# Patient Record
Sex: FEMALE | Race: WHITE | Employment: FULL TIME | URBAN - METROPOLITAN AREA
[De-identification: names, ages, dates, MRNs, and addresses within clinical notes are randomized per-mention and may not be internally consistent; named-entity substitution may affect disease eponyms.]

---

## 2019-07-01 ENCOUNTER — APPOINTMENT (OUTPATIENT)
Dept: GENERAL RADIOLOGY | Age: 24
End: 2019-07-01
Attending: NURSE PRACTITIONER
Payer: OTHER GOVERNMENT

## 2019-07-01 ENCOUNTER — HOSPITAL ENCOUNTER (EMERGENCY)
Age: 24
Discharge: HOME | End: 2019-07-01
Attending: EMERGENCY MEDICINE
Payer: OTHER GOVERNMENT

## 2019-07-01 VITALS
BODY MASS INDEX: 23.39 KG/M2 | WEIGHT: 137 LBS | OXYGEN SATURATION: 97 % | SYSTOLIC BLOOD PRESSURE: 115 MMHG | HEART RATE: 79 BPM | DIASTOLIC BLOOD PRESSURE: 69 MMHG | TEMPERATURE: 98.5 F | RESPIRATION RATE: 18 BRPM | HEIGHT: 64 IN

## 2019-07-01 DIAGNOSIS — T07.XXXA ABRASIONS OF MULTIPLE SITES: ICD-10-CM
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
## 2019-07-01 LAB
HCG UR QL: NEGATIVE
HCG UR QL: NEGATIVE

## 2019-07-01 PROCEDURE — 81025 URINE PREGNANCY TEST: CPT

## 2019-07-01 PROCEDURE — 99284 EMERGENCY DEPT VISIT MOD MDM: CPT

## 2019-07-01 PROCEDURE — 73590 X-RAY EXAM OF LOWER LEG: CPT

## 2019-07-01 PROCEDURE — 71046 X-RAY EXAM CHEST 2 VIEWS: CPT

## 2019-07-01 PROCEDURE — 73502 X-RAY EXAM HIP UNI 2-3 VIEWS: CPT

## 2019-07-01 RX ORDER — IBUPROFEN 600 MG/1
600 TABLET ORAL
Qty: 20 TAB | Refills: 0 | Status: SHIPPED | OUTPATIENT
Start: 2019-07-01 | End: 2020-08-14
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
## 2019-07-02 NOTE — DISCHARGE INSTRUCTIONS

## 2019-07-13 ENCOUNTER — HOSPITAL ENCOUNTER (EMERGENCY)
Age: 24
Discharge: HOME | End: 2019-07-13
Attending: EMERGENCY MEDICINE
Payer: OTHER GOVERNMENT

## 2019-07-13 VITALS
DIASTOLIC BLOOD PRESSURE: 70 MMHG | OXYGEN SATURATION: 97 % | HEART RATE: 77 BPM | WEIGHT: 135 LBS | SYSTOLIC BLOOD PRESSURE: 108 MMHG | HEIGHT: 64 IN | TEMPERATURE: 98.1 F | BODY MASS INDEX: 23.05 KG/M2 | RESPIRATION RATE: 18 BRPM

## 2019-07-13 DIAGNOSIS — N93.9 VAGINAL BLEEDING: Primary | ICD-10-CM

## 2019-07-13 LAB
APPEARANCE UR: CLEAR
BACTERIA URNS QL MICRO: NEGATIVE /HPF
BILIRUB UR QL: NEGATIVE
COLOR UR: ABNORMAL
EPITH CASTS URNS QL MICRO: ABNORMAL /LPF
GLUCOSE UR STRIP.AUTO-MCNC: NEGATIVE MG/DL
HCG UR QL: NEGATIVE
HGB UR QL STRIP: ABNORMAL
HYALINE CASTS URNS QL MICRO: ABNORMAL /LPF (ref 0–5)
KETONES UR QL STRIP.AUTO: NEGATIVE MG/DL
LEUKOCYTE ESTERASE UR QL STRIP.AUTO: NEGATIVE
NITRITE UR QL STRIP.AUTO: NEGATIVE
PH UR STRIP: 6 [PH] (ref 5–8)
PROT UR STRIP-MCNC: NEGATIVE MG/DL
RBC #/AREA URNS HPF: ABNORMAL /HPF (ref 0–5)
SP GR UR REFRACTOMETRY: 1.01 (ref 1–1.03)
UROBILINOGEN UR QL STRIP.AUTO: 0.2 EU/DL (ref 0.2–1)
WBC URNS QL MICRO: ABNORMAL /HPF (ref 0–4)

## 2019-07-13 PROCEDURE — 99283 EMERGENCY DEPT VISIT LOW MDM: CPT

## 2019-07-13 PROCEDURE — 81025 URINE PREGNANCY TEST: CPT

## 2019-07-13 PROCEDURE — 81001 URINALYSIS AUTO W/SCOPE: CPT

## 2019-07-13 RX ORDER — IBUPROFEN 600 MG/1
600 TABLET ORAL
Status: DISCONTINUED | OUTPATIENT
Start: 2019-07-13 | End: 2019-07-13

## 2019-07-13 NOTE — ED PROVIDER NOTES
71-year-old female presenting to ER with vaginal bleeding and lower abdominal cramping. Patient is concerned that she may be having a miscarriage. Patient has had no positive home pregnancy test.  Patient reports having history of previous miscarriage with pregnancy test not registering that she was 10 weeks pregnant. Patient reports only mild abdominal cramping with no abdominal pain. No associated symptoms of nausea, vomiting, diarrhea constipation or dysuria. Patient denies passing any tissue only vaginal bleeding with some clots. Patient has no symptoms of feeling fatigue or malaise or lightheaded. [facility] 6/12/19. No positive preg test.   Mild cramping now and filling 1 tampon q 4 hours. No past medical history on file. No past surgical history on file. No family history on file.     Social History     Socioeconomic History    Marital status: SINGLE     Spouse name: Not on file    Number of children: Not on file    Years of education: Not on file    Highest education level: Not on file   Occupational History    Not on file   Social Needs    Financial resource strain: Not on file    Food insecurity:     Worry: Not on file     Inability: Not on file    Transportation needs:     Medical: Not on file     Non-medical: Not on file   Tobacco Use    Smoking status: Never Smoker    Smokeless tobacco: Never Used   Substance and Sexual Activity    Alcohol use: Not Currently    Drug use: Not on file    Sexual activity: Not on file   Lifestyle    Physical activity:     Days per week: Not on file     Minutes per session: Not on file    Stress: Not on file   Relationships    Social connections:     Talks on phone: Not on file     Gets together: Not on file     Attends Adventism service: Not on file     Active member of club or organization: Not on file     Attends meetings of clubs or organizations: Not on file     Relationship status: Not on file    Intimate partner violence: Fear of current or ex partner: Not on file     Emotionally abused: Not on file     Physically abused: Not on file     Forced sexual activity: Not on file   Other Topics Concern    Not on file   Social History Narrative    Not on file         ALLERGIES: Patient has no known allergies. Review of Systems   Constitutional: Negative for chills and fever. HENT: Negative for congestion and sore throat. Eyes: Negative for pain. Respiratory: Negative for shortness of breath. Cardiovascular: Negative for chest pain. Gastrointestinal: Negative for abdominal pain, diarrhea, nausea and vomiting. Genitourinary: Positive for vaginal bleeding. Negative for dysuria, flank pain, pelvic pain, urgency, vaginal discharge and vaginal pain. Musculoskeletal: Negative for back pain and neck pain. Skin: Negative for rash. Neurological: Negative for dizziness and headaches. Vitals:    07/13/19 0530   BP: 108/70   Pulse: 77   Resp: 18   Temp: 98.1 °F (36.7 °C)   SpO2: 97%   Weight: 61.2 kg (135 lb)   Height: 5' 4\" (1.626 m)            Physical Exam   Constitutional: She is oriented to person, place, and time. She appears well-developed and well-nourished. HENT:   Head: Normocephalic. Mouth/Throat: Oropharynx is clear and moist.   Eyes: Conjunctivae are normal.   Neck: Normal range of motion. Neck supple. Cardiovascular: Normal rate and regular rhythm. Pulmonary/Chest: Effort normal and breath sounds normal. No respiratory distress. Abdominal: Soft. Bowel sounds are normal. There is no tenderness. Genitourinary: Uterus normal. Rectal exam shows guaiac negative stool. Pelvic exam was performed with patient prone. There is no rash, tenderness, lesion or injury on the right labia. There is no rash, tenderness, lesion or injury on the left labia. Cervix exhibits no motion tenderness and no friability. Right adnexum displays no mass, no tenderness and no fullness.  Left adnexum displays no mass, no tenderness and no fullness. There is bleeding in the vagina. Genitourinary Comments: Os closed   Musculoskeletal: Normal range of motion. Neurological: She is alert and oriented to person, place, and time. No gross motor or sensory deficits   Skin: Skin is warm. Capillary refill takes less than 2 seconds. No rash noted. MDM  Number of Diagnoses or Management Options  Vaginal bleeding:   Diagnosis management comments: Patient presented with vaginal bleeding passing clots. Pregnancy test negative. Abdominal exam unremarkable. Pelvic exam only mild vaginal bleeding but no tenderness cervical Os closed. Patient is last mental cycle 1 month ago. Explained the patient that the negative pregnancy test is unlikely miscarriage and most likely her menstrual cycle or dysfunctional uterine bleeding. Advised patient to follow-up with her OB/GYN.          Procedures

## 2019-07-13 NOTE — ED TRIAGE NOTES
Pt states cramping for 2 days. Today states heavy bleeding, thinks possible miscarriage. [facility] 6/12/19. No positive preg test.   Mild cramping now and filling 1 tampon q 4 hours.

## 2019-07-13 NOTE — DISCHARGE INSTRUCTIONS
Patient Education        Abnormal Uterine Bleeding: Care Instructions  Your Care Instructions    Abnormal uterine bleeding (AUB) is irregular bleeding from the uterus that is longer or heavier than usual or does not occur at your regular time. Sometimes it is caused by changes in hormone levels. It can also be caused by growths in the uterus, such as fibroids or polyps. Sometimes a cause cannot be found. You may have heavy bleeding when you are not expecting your period. Your doctor may suggest a pregnancy test, if you think you are pregnant. Follow-up care is a key part of your treatment and safety. Be sure to make and go to all appointments, and call your doctor if you are having problems. It's also a good idea to know your test results and keep a list of the medicines you take. How can you care for yourself at home? · Be safe with medicines. Take pain medicines exactly as directed. ? If the doctor gave you a prescription medicine for pain, take it as prescribed. ? If you are not taking a prescription pain medicine, ask your doctor if you can take an over-the-counter medicine. · You may be low in iron because of blood loss. Eat a balanced diet that is high in iron and vitamin C. Foods rich in iron include red meat, shellfish, eggs, beans, and leafy green vegetables. Talk to your doctor about whether you need to take iron pills or a multivitamin. When should you call for help? Call 911 anytime you think you may need emergency care. For example, call if:    · You passed out (lost consciousness).    Call your doctor now or seek immediate medical care if:    · You have new or worse belly or pelvic pain.     · You have severe vaginal bleeding.     · You feel dizzy or lightheaded, or you feel like you may faint.    Watch closely for changes in your health, and be sure to contact your doctor if:    · You think you may be pregnant.     · Your bleeding gets worse.     · You do not get better as expected.    Where can you learn more? Go to http://dunia-brandy.info/. Enter D206 in the search box to learn more about \"Abnormal Uterine Bleeding: Care Instructions. \"  Current as of: May 14, 2018  Content Version: 11.9  © 9247-1732 [employer], [employer] [place]. Care instructions adapted under license by [employer] (which disclaims liability or warranty for this information). If you have questions about a medical condition or this instruction, always ask your healthcare professional. [place] [address] any warranty or liability for your use of this information.

## 2019-07-17 NOTE — ED PROVIDER NOTES
75-year-old female presenting to ER with complaint of vaginal bleeding. Patient reports she is concerned she is possibly having a miscarriage however has had no positive pregnancy test at home. Patient reports having lower abdominal cramping for 2 days and heavy bleeding with clots. Patient reports last menstrual cycle was 6/12/2019  Patient denies severe abdominal pain. No discharge. No nausea vomiting diarrhea. No fever or chills. Patient reports that she has history of a miscarriage previously and they did not detect pregnancy until she was 10 weeks along. Patient has no other complaints           No past medical history on file. No past surgical history on file. No family history on file.     Social History     Socioeconomic History    Marital status: SINGLE     Spouse name: Not on file    Number of children: Not on file    Years of education: Not on file    Highest education level: Not on file   Occupational History    Not on file   Social Needs    Financial resource strain: Not on file    Food insecurity:     Worry: Not on file     Inability: Not on file    Transportation needs:     Medical: Not on file     Non-medical: Not on file   Tobacco Use    Smoking status: Never Smoker    Smokeless tobacco: Never Used   Substance and Sexual Activity    Alcohol use: Not Currently    Drug use: Not on file    Sexual activity: Not on file   Lifestyle    Physical activity:     Days per week: Not on file     Minutes per session: Not on file    Stress: Not on file   Relationships    Social connections:     Talks on phone: Not on file     Gets together: Not on file     Attends Sabianism service: Not on file     Active member of club or organization: Not on file     Attends meetings of clubs or organizations: Not on file     Relationship status: Not on file    Intimate partner violence:     Fear of current or ex partner: Not on file     Emotionally abused: Not on file     Physically abused: Not on file     Forced sexual activity: Not on file   Other Topics Concern    Not on file   Social History Narrative    Not on file         ALLERGIES: Patient has no known allergies. Review of Systems   Constitutional: Negative for chills and fever. HENT: Negative for congestion and sore throat. Eyes: Negative for pain. Respiratory: Negative for shortness of breath. Cardiovascular: Negative for chest pain. Gastrointestinal: Negative for abdominal distention, abdominal pain, constipation, diarrhea, nausea, rectal pain and vomiting. Genitourinary: Positive for vaginal bleeding. Negative for dysuria, flank pain, pelvic pain, vaginal discharge and vaginal pain. Musculoskeletal: Negative for back pain and neck pain. Skin: Negative for rash. Neurological: Negative for dizziness and headaches. Vitals:    07/13/19 0530   BP: 108/70   Pulse: 77   Resp: 18   Temp: 98.1 °F (36.7 °C)   SpO2: 97%   Weight: 61.2 kg (135 lb)   Height: 5' 4\" (1.626 m)            Physical Exam   Constitutional: She is oriented to person, place, and time. She appears well-developed and well-nourished. HENT:   Head: Normocephalic. Mouth/Throat: Oropharynx is clear and moist.   Eyes: Conjunctivae are normal.   Neck: Normal range of motion. Neck supple. Cardiovascular: Normal rate and regular rhythm. Pulmonary/Chest: Effort normal and breath sounds normal. No respiratory distress. Abdominal: Soft. Bowel sounds are normal. There is no tenderness. Genitourinary: Uterus normal. Cervix exhibits no motion tenderness, no discharge and no friability. Right adnexum displays no mass, no tenderness and no fullness. Left adnexum displays no mass, no tenderness and no fullness. There is bleeding in the vagina. Genitourinary Comments: Os closed   Musculoskeletal: Normal range of motion. Neurological: She is alert and oriented to person, place, and time. No gross motor or sensory deficits   Skin: Skin is warm.  Capillary refill takes less than 2 seconds. No rash noted.         MDM  Number of Diagnoses or Management Options  Vaginal bleeding:          Procedures

## 2020-05-14 NOTE — PROGRESS NOTES
Annual exam ages 21-44  (new patient)    Maddie Washington is a ,  25 y[facility] Patient's last menstrual period was 2020., who presents for her annual checkup. Since her last annual GYN exam about 2017, she has had the following changes in her health history: D&C 2018 at 10 weeks pregnant. No complications. Concerned with not being able to get pregnant again. Would like UPT today. Not actively trying to get pregnant, but not trying to prevent it. Has been with current partner for a little over a year (different partner from prev pregnancy). He has 2 children, 2 and 3yo. ADDITIONAL CONCERNS:  She is having significant concerns with a possible UTI. Burning when urinating. Would like a urine culture collected today. Says partner recently got tested for STD's and were all negative. Pt interested in full STD testing today. Would like blood work including HSV. With regard to the Gardasil vaccine, she has not received it yet. Menstrual status:    Her periods are moderate in flow. She is using three to five pads or tampons per day, usually regular and last 26-30 days. Last cycle was 30 days, but is usually about 26-28. She denies dysmenorrhea. She denies premenstrual symptoms. Contraception:    The current method of family planning is none. Sexual history:     She  reports being sexually active and has had partner(s) who are Male. She reports using the following method of birth control/protection: None. .        Pap and Mammogram History:    Her most recent Pap smear was normal, per pt report, obtained 2017 at another office. She does not have a history of abnormal paps. The patient has never had a mammogram.    Breast Cancer History/Substance Abuse:    History reviewed. No pertinent past medical history.   Past Surgical History:   Procedure Laterality Date    HX DILATION AND CURETTAGE  2018    10 wk miscarriage     OB History    Para Term  AB Living   1       1     SAB TAB Ectopic Molar Multiple Live Births   1                # Outcome Date GA Lbr [place]/2nd Weight Sex Delivery Anes PTL Lv   1 SAB 04/2018               Current Outpatient Medications   Medication Sig Dispense Refill    PNV No12-Iron-FA-DSS-OM-3 29 mg iron-1 mg -50 mg CPKD Take  by mouth.  ibuprofen (MOTRIN) 600 mg tablet Take 1 Tab by mouth every six (6) hours as needed for Pain. 20 Tab 0     Allergies: Patient has no known allergies.    Social History     Socioeconomic History    Marital status:      Spouse name: Not on file    Number of children: Not on file    Years of education: Not on file    Highest education level: Not on file   Occupational History    Not on file   Social Needs    Financial resource strain: Not on file    Food insecurity     Worry: Not on file     Inability: Not on file    Transportation needs     Medical: Not on file     Non-medical: Not on file   Tobacco Use    Smoking status: Never Smoker    Smokeless tobacco: Never Used   Substance and Sexual Activity    Alcohol use: Not Currently    Drug use: Never    Sexual activity: Yes     Partners: Male     Birth control/protection: None     Comment: twice per week (weekends)   Lifestyle    Physical activity     Days per week: Not on file     Minutes per session: Not on file    Stress: Not on file   Relationships    Social connections     Talks on phone: Not on file     Gets together: Not on file     Attends Muslim service: Not on file     Active member of club or organization: Not on file     Attends meetings of clubs or organizations: Not on file     Relationship status: Not on file    Intimate partner violence     Fear of current or ex partner: Not on file     Emotionally abused: Not on file     Physically abused: Not on file     Forced sexual activity: Not on file   Other Topics Concern    Not on file   Social History Narrative    Not on file     Tobacco History:  reports that she has never smoked. She has never used smokeless tobacco.  Alcohol Abuse:  reports previous alcohol use. Drug Abuse:  reports no history of drug use. There is no problem list on file for this patient. History reviewed. No pertinent family history.     Review of Systems - History obtained from the patient  Constitutional: negative for weight loss, fever, night sweats  HEENT: negative for hearing loss, earache, congestion, snoring, sorethroat  CV: negative for chest pain, palpitations, edema  Resp: negative for cough, shortness of breath, wheezing  GI: negative for change in bowel habits, abdominal pain, black or bloody stools  : negative for frequency, hematuria, vaginal discharge  MSK: negative for back pain, joint pain, muscle pain  Breast: negative for breast lumps, nipple discharge, galactorrhea  Skin :negative for itching, rash, hives  Neuro: negative for dizziness, headache, confusion, weakness  Psych: negative for anxiety, depression, change in mood  Heme/lymph: negative for bleeding, bruising, pallor    Physical Exam    Visit Vitals  /71 (BP 1 Location: Left arm, BP Patient Position: Sitting)   Ht 5' 4\" (1.626 m)   Wt 151 lb (68.5 kg)   LMP 04/19/2020   BMI 25.92 kg/m²       Constitutional  · Appearance: well-nourished, well developed, alert, in no acute distress    HENT  · Head and Face: appears normal    Neck  · Inspection/Palpation: normal appearance, no masses or tenderness  · Lymph Nodes: no lymphadenopathy present  · Thyroid: gland size normal, nontender, no nodules or masses present on palpation    Chest  · Respiratory Effort: breathing unlabored  · Auscultation: normal breath sounds    Cardiovascular  · Heart:  · Auscultation: regular rate and rhythm without murmur    Breasts  · Inspection of Breasts: breasts symmetrical, no skin changes, no discharge present, nipple appearance normal, no skin retraction present  · Palpation of Breasts and Axillae: no masses present on palpation, mild bilateral breast tenderness (due for period in 2d)  · Axillary Lymph Nodes: no lymphadenopathy present    Gastrointestinal  · Abdominal Examination: abdomen non-tender to palpation, normal bowel sounds, no masses present  · Liver and spleen: no hepatomegaly present, spleen not palpable  · Hernias: no hernias identified    Genitourinary  · External Genitalia: normal appearance for age, no discharge present, no tenderness present, no inflammatory lesions present, no masses present, no atrophy present  · Vagina: normal vaginal vault without central or paravaginal defects, no discharge present, no inflammatory lesions present, no masses present  · Bladder: non-tender to palpation  · Urethra: appears normal  · Cervix: normal   · Uterus: normal size, shape and consistency  · Adnexa: no adnexal tenderness present, no adnexal masses present  · Perineum: perineum within normal limits, no evidence of trauma, no rashes or skin lesions present  · Anus: anus within normal limits, no hemorrhoids present  · Inguinal Lymph Nodes: no lymphadenopathy present    Skin  · General Inspection: no rash, no lesions identified    Neurologic/Psychiatric  · Mental Status:  · Orientation: grossly oriented to person, place and time  · Mood and Affect: mood normal, affect appropriate      Results for orders placed or performed in visit on 05/15/20   AMB POC URINE PREGNANCY TEST, VISUAL COLOR COMPARISON   Result Value Ref Range    VALID INTERNAL CONTROL POC Yes     HCG urine, Ql. (POC) Negative Negative         Assessment & Plan:  · Routine gynecologic examination. Pap done. · NuSwab screening <26yo. · Dysuria -> UCx  · Vulvovaginal burning -> add BV.yeast to N[facility]wab Plus  · Req STD screen -> [facility]. Will draw HIV, T.pallidum, HBsAg, HepC, HSV. Handout given.   · UPT at pt request -> neg  · Counseled re: diet, exercise, healthy lifestyle  · Return for yearly wellness visits  · Gardisil counseling provided  · Discussed fertility w/u not done unless actively trying to get pregnant. Insurance coverage variable, advised to check if she wants to pursue. Briefly discussed OPK, SA.  · Patient verbalized understanding      Orders Placed This Encounter    CULTURE, URINE    HEP B SURFACE AG    HCV AB W/REFLEX VERIFICATION    T PALLIDUM SCREEN W/REFLEX    HIV SCREEN, 4TH GEN. W/REFLEX CONFIRM    HSV 1 AND 2-SPEC AB, IGG W/RFX TO SUPPL HSV-2 TESTING    NUSWAB VAGINITIS PLUS ([facility])    AMB POC URINE PREGNANCY TEST, VISUAL COLOR COMPARISON    PAP, IG, RFX HPV ASCUS ([id number])     Order Specific Question:   Pap Source? Answer:   Cervical and Endocervical     Order Specific Question:   Total Hysterectomy? Answer:   No     Order Specific Question:   Supracervical Hysterectomy? Answer:   No     Order Specific Question:   Post Menopausal?     Answer:   No     Order Specific Question:   Hormone Therapy? Answer:   No     Order Specific Question:   IUD? Answer:   No     Order Specific Question:   Abnormal Bleeding? Answer:   No     Order Specific Question:   Pregnant     Answer:   No     Order Specific Question:   Post Partum? Answer:    No

## 2020-05-15 ENCOUNTER — OFFICE VISIT (OUTPATIENT)
Dept: OBGYN CLINIC | Age: 25
End: 2020-05-15

## 2020-05-15 VITALS
BODY MASS INDEX: 25.78 KG/M2 | SYSTOLIC BLOOD PRESSURE: 111 MMHG | WEIGHT: 151 LBS | DIASTOLIC BLOOD PRESSURE: 71 MMHG | HEIGHT: 64 IN

## 2020-05-15 DIAGNOSIS — Z12.4 SCREENING FOR CERVICAL CANCER: ICD-10-CM

## 2020-05-15 DIAGNOSIS — Z32.02 PREGNANCY TEST NEGATIVE: ICD-10-CM

## 2020-05-15 DIAGNOSIS — R30.0 BURNING WITH URINATION: ICD-10-CM

## 2020-05-15 DIAGNOSIS — Z11.3 SCREENING FOR STD (SEXUALLY TRANSMITTED DISEASE): ICD-10-CM

## 2020-05-15 DIAGNOSIS — N94.9 VAGINAL BURNING: ICD-10-CM

## 2020-05-15 DIAGNOSIS — Z01.419 ENCOUNTER FOR GYNECOLOGICAL EXAMINATION: Primary | ICD-10-CM

## 2020-05-15 LAB
HCG URINE, QL. (POC): NEGATIVE
VALID INTERNAL CONTROL?: YES

## 2020-05-19 LAB
BACTERIA UR CULT: NO GROWTH
COMMENT, 144067: NORMAL
CYTOLOGIST CVX/VAG CYTO: NORMAL
CYTOLOGY CVX/VAG DOC CYTO: NORMAL
CYTOLOGY CVX/VAG DOC THIN PREP: NORMAL
DX ICD CODE: NORMAL
HBV SURFACE AG SERPL QL IA: NEGATIVE
HCV AB S/CO SERPL IA: <0.1 S/CO RATIO (ref 0–0.9)
HIV 1+2 AB+HIV1 P24 AG SERPL QL IA: NON REACTIVE
HSV1 IGG SER IA-ACNC: 7.72 INDEX (ref 0–0.9)
HSV2 IGG SER IA-ACNC: <0.91 INDEX (ref 0–0.9)
LABCORP, 190119: NORMAL
Lab: NORMAL
OTHER STN SPEC: NORMAL
STAT OF ADQ CVX/VAG CYTO-IMP: NORMAL
TREPONEMA PALLIDUM IGG+IGM AB [PRESENCE] IN SERUM OR PLASMA BY IMMUNOASSAY: NON REACTIVE

## 2020-05-20 ENCOUNTER — TELEPHONE (OUTPATIENT)
Dept: OBGYN CLINIC | Age: 25
End: 2020-05-20

## 2020-05-20 NOTE — TELEPHONE ENCOUNTER
----- Message from Bakari Kaur MD sent at 5/19/2020  4:59 PM EDT -----  Pap smear is normal.  Urine culture is negative for infection. Vaginal swab is pending. Blood work is positive for Herpes Type 1 (HSV I). This is most often an oral infection (cold sores), but can also be a genital infection. No way to know based on blood work. Other blood work is normal/negative (HIV, syphilis, Hepatitis B and C, Herpes Type 2). Please notify pt.

## 2020-05-21 NOTE — TELEPHONE ENCOUNTER
Call received at 605am    25year old patient last seen in the office on 5/15/2020. Patient calling back regarding her lab work. Patient advised regarding MD reviewed lab work and verbalized understanding. Result Notes for PAP, IG, RFX HPV ASCUS ([id number])     Notes recorded by Wilner Henderson MD on 5/19/2020 at 4:59 PM EDT  Pap smear is normal.  Urine culture is negative for infection. Vaginal swab is pending. Blood work is positive for Herpes Type 1 (HSV I).  This is most often an oral infection (cold sores), but can also be a genital infection.  No way to know based on blood work. Other blood work is normal/negative (HIV, syphilis, Hepatitis B and C, Herpes Type 2). Please notify pt.

## 2020-05-30 LAB
A VAGINAE DNA VAG QL NAA+PROBE: NORMAL SCORE
BVAB2 DNA VAG QL NAA+PROBE: NORMAL SCORE
C ALBICANS DNA VAG QL NAA+PROBE: NEGATIVE
C GLABRATA DNA VAG QL NAA+PROBE: NEGATIVE
C TRACH DNA VAG QL NAA+PROBE: NEGATIVE
MEGA1 DNA VAG QL NAA+PROBE: NORMAL SCORE
N GONORRHOEA DNA VAG QL NAA+PROBE: NEGATIVE
T VAGINALIS DNA VAG QL NAA+PROBE: NEGATIVE

## 2020-06-22 ENCOUNTER — NURSE TRIAGE (OUTPATIENT)
Dept: OTHER | Facility: CLINIC | Age: 25
End: 2020-06-22

## 2020-06-22 NOTE — TELEPHONE ENCOUNTER
Pt report cough, HA, congestions, that started this weekend. Pt report she works on [employer]. Pt was tested at Urgent Care for the coronavirus and awaiting results. Pt denies CP, SOB, and fever at this time. Pt in agreement with care advice and disposition. Pt given [employer] phone number. Reason for Disposition   [1] Symptoms of COVID-19 (e.g., cough, fever, SOB, or others) AND [2] within 14 days of EXPOSURE (close contact) with diagnosed or suspected COVID-19 patient   COVID-19, questions about    Answer Assessment - Initial Assessment Questions  1. CLOSE CONTACT: \"Who is the person with the confirmed or suspected COVID-19 infection that you were exposed to?\"      Patient     2. PLACE of CONTACT: \"Where were you when you were exposed to COVID-19? \" (e.g., home, school, medical waiting room; which city?)      Pt works on [place] unit    3. TYPE of CONTACT: \"How much contact was there? \" (e.g., sitting next to, live in same house, work in same office, same building)      12 hrs     4. DURATION of CONTACT: \"How long were you in contact with the COVID-19 patient? \" (e.g., a few seconds, passed by person, a few minutes, live with the patient)      Work on the floor     5. DATE of CONTACT: \"When did you have contact with a COVID-19 patient? \" (e.g., how many days ago)      06/19/2020    6. TRAVEL: \"Have you traveled out of the country recently? \" If so, \"When and where? \"      * Also ask about out-of-state travel, since the CDC has identified some high-risk cities for community spread in the [address]. * Note: Travel becomes less relevant if there is widespread community transmission where the patient lives. No     7. COMMUNITY SPREAD: \"Are there lots of cases of COVID-19 (community spread) where you live? \" (See public health department website, if unsure)        Unsure     8. SYMPTOMS: \"Do you have any symptoms? \" (e.g., fever, cough, breathing difficulty)      Cough, HA, congestion, and sore throat. 9.  PREGNANCY OR POSTPARTUM: \"Is there any chance you are pregnant? \" \"When was your last menstrual period? \" \"Did you deliver in the last 2 weeks? \"        10. HIGH RISK: \"Do you have any heart or lung problems? Do you have a weak immune system? \" (e.g., CHF, COPD, asthma, HIV positive, chemotherapy, renal failure, diabetes mellitus, sickle cell anemia)        No    Protocols used: CORONAVIRUS (COVID-19) EXPOSURE-ADULT-[place], CORONAVIRUS (COVID-19) DIAGNOSED OR SUSPECTED-ADULT-[place]

## 2020-07-24 ENCOUNTER — NURSE TRIAGE (OUTPATIENT)
Dept: OTHER | Facility: CLINIC | Age: 25
End: 2020-07-24

## 2020-07-24 ENCOUNTER — TELEPHONE (OUTPATIENT)
Dept: OBGYN CLINIC | Age: 25
End: 2020-07-24

## 2020-07-24 NOTE — TELEPHONE ENCOUNTER
Reason for Disposition   MILD constant abdominal pain (e.g., doesn't interfere with normal activities) and present > 2 hours    Answer Assessment - Initial Assessment Questions  1. LOCATION: \"Where does it hurt? \"       Upper right quadrant  2. RADIATION: \"Does the pain shoot anywhere else? \" (e.g., chest, back, shoulder)      Down to right side and lower back  3. ONSET: \"When did the pain begin? \" (e.g., minutes, hours or days ago)       Last night  4. ONSET: \"Gradual or sudden onset? \"      Sudden onset  5. PATTERN \"Does the pain come and go, or has it been constant since it started? \"       constant  6. SEVERITY: \"How bad is the pain? \" \"What does it keep you from doing? \"  (e.g., Scale 1-10; mild, moderate, or severe)    - MILD (1-3): doesn't interfere with normal activities, abdomen soft and not tender to touch     - MODERATE (4-7): interferes with normal activities or awakens from sleep, tender to touch     - SEVERE (8-10): excruciating pain, doubled over, unable to do any normal activities     4/10  7. RECURRENT SYMPTOM: \"Have you ever had this type of abdominal pain before? \" If so, ask: \"When was the last time? \" and \"What happened that time? \"       no  8. CAUSE: \"What do you think is causing the abdominal pain? \"      Not sure  9. RELIEVING/AGGRAVATING FACTORS: \"What makes it better or worse? \" (e.g., antacids, bowel movement, movement)      Coughing makes it worse  10. OTHER SYMPTOMS: \"Has there been any vaginal bleeding, fever, vomiting, diarrhea, or urine problems? \"        No other   11. ESTELLA: \"What date are you expecting to deliver? \"        03/2021    Protocols used: PREGNANCY - ABDOMINAL PAIN LESS THAN 20 WEEKS EGA-ADULT-[place]    First appointment is scheduled for August 14, 2020. Caller triaged, care advice provided, caller verbalized understanding. Please do not reply to the triage nurse through this encounter. Any subsequent communication should be directly with the patient.

## 2020-07-24 NOTE — TELEPHONE ENCOUNTER
Patient calling this afternoon. Last seen 05/15/2020. States she about 5 and a half weeks pregnant and she is having bad upper quadrant pain in abdomin, states it radiates to lower hip and back. States she's concerned due to a previous miscarriage 2 years ago. Denies bleeding but has minor cramps. Please advise       Transferred to [facility]'s nurse.

## 2020-08-13 NOTE — PROGRESS NOTES
Current pregnancy history:    Christopher Camp is a 25 y.o. female who presents for the evaluation of pregnancy. Patient's last menstrual period was 06/15/2020. LMP history:  The date of her LMP is certain. Her menstrual cycles are regular and occur approximately every 28 days and range from 3 to 5 days. The last menses did last the usual number of days. A urine pregnancy test was positive. She was not on the pill at conception. Based on her LMP her EGA is 8 weeks and 4 days giving an [address] of 3/22/21. Ultrasound data:  She had an ultrasound done by the ultrasound tech today which revealed a viable sinha pregnancy with a gestational age of 11 weeks and 4 days giving an [address] of 3/22/21. Ultrasound details:    TA ULTRASOUND PERFORMED. A SINGLE VIABLE 8W4D IUP IS SEEN WITH NORMAL CARDIAC RHYTHM. GESTATIONAL AGE BASED ON TODAYS US.  A NORMAL APPEARING YOLK [address] IS SEEN. RIGHT & LEFT OVARIES APPEAR WITHIN NORMAL LIMITS. NO FREE FLUID SEEN IN THE CDS. Pregnancy symptoms:    Since her LMP she has experienced  urinary frequency, breast tenderness, fatigue and nausea. Has not been taking anything for the nausea. She has not been vomiting over the last few weeks. Associated signs and symptoms which she denies: dysuria, discharge, vaginal bleeding. She states she has not gained/lost weight (review of CC shows wt down 1# from AE in May)    Relevant past pregnancy history:  She has the following pregnancy history:   D&C for SAB 2018, 10w by dates, 7w by US      Relevant past medical history:(relevant to this pregnancy): noncontributory. 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 Positive for nothing. Exposure history: There is/are no indoor cat/s in the home. The patient was instructed to not change the cat litter.    She admits close contact with children on a regular basis. She has had chicken pox or the vaccine in the past.   Patient denies issues with domestic violence. Genetic Screening/Teratology Counseling: (Includes patient, baby's father, or anyone in either family with:)  3.  Patient's age >/= 28 at [facility]?-- no  .   2. Thalassemia ([facility], [place], [address], or  background): MCV<80?--no.     3.  Neural tube defect (meningomyelocele, spina bifida, anencephaly)?--no.   4.  Congenital heart defect?--no.  5.  Down syndrome?--no.   6.  Black-Sachs (Islam, [place])?--no.   7.  Canavan's Disease?--no.   8.  Familial Dysautonomia?--no.   9.  Sickle cell disease or trait ()? --no   The patient has not been tested for sickle trait  10. Hemophilia or other blood disorders?--no. 11.  Muscular dystrophy?--no. 12.  Cystic fibrosis?--no. 13.  Sumter's Chorea?--no. 14.  Mental retardation/autism (if yes was person tested for Fragile X)?--no. 15.  Other inherited genetic or chromosomal disorder?--no. 12.  Maternal metabolic disorder (DM, PKU, etc)?--no. 17.  Patient or FOB with a child with a birth defect not listed above?--no.  17a. Patient or FOB with a birth defect themselves?--no. 18.  Recurrent pregnancy loss, or stillbirth?--no. 19.  Any medications since LMP other than prenatal vitamins (include vitamins,  supplements, OTC meds, drugs, alcohol)?--no. 20.  Any other genetic/environmental exposure to discuss?--no. Infection History:  1. Lives with someone with TB or TB exposed?--no.   2.  Patient or partner has history of genital herpes?--no.  3.  Rash or viral illness since LMP?--no.    4.  History of STD (GC, CT, HPV, syphilis, HIV)? --no   5. Other: OTHER? No past medical history on file.   Past Surgical History:   Procedure Laterality Date    HX DILATION AND CURETTAGE  04/2018    10 wk miscarriage     Social History     Occupational History    Not on file   Tobacco Use    Smoking status: Never Smoker    Smokeless tobacco: Never Used   Substance and Sexual Activity    Alcohol use: Not Currently    Drug use: Never    Sexual activity: Yes     Partners: Male     Birth control/protection: None     Comment: twice per week (weekends)     No family history on file. No Known Allergies  Prior to Admission medications    Medication Sig Start Date End Date Taking? Authorizing Provider   doxylamine-pyridoxine, vit B6, (Diclegis) 10-10 mg TbEC DR tablet 2 tabs at bedtime, then 1 tab in AM if needed, then 1 tab in afternoon if needed. Max 4 tabs/d 8/14/20  Yes Chuy Shaw MD   promethazine (PHENERGAN) 25 mg tablet Take 1 Tab by mouth every six (6) hours as needed for Nausea. 8/14/20  Yes Chuy Shaw MD   ondansetron (Zofran ODT) 4 mg disintegrating tablet Take 1 Tab by mouth every eight (8) hours as needed for Nausea. 8/14/20  Yes Chuy Shaw MD   PNV No12-Iron-FA-DSS-OM-3 29 mg iron-1 mg -50 mg CPKD Take  by mouth.    Yes Other, MD [place]        Review of Systems: History obtained from the patient  Constitutional: negative for weight loss, fever, night sweats  HEENT: negative for hearing loss, earache, congestion, snoring, sorethroat  CV: negative for chest pain, palpitations, edema  Resp: negative for cough, shortness of breath, wheezing  Breast: negative for breast lumps, nipple discharge, galactorrhea  GI: negative for change in bowel habits, abdominal pain, black or bloody stools  : negative for frequency, dysuria, hematuria, vaginal discharge  MSK: negative for back pain, joint pain, muscle pain  Skin: negative for itching, rash, hives  Neuro: negative for dizziness, headache, confusion, weakness  Psych: negative for anxiety, depression, change in mood  Heme/lymph: negative for bleeding, bruising, pallor    Objective:  Visit Vitals  /72 (BP 1 Location: Left arm, BP Patient Position: Sitting)   Ht 5' 4\" (1.626 m)   Wt 150 lb (68 kg)   LMP 06/15/2020   BMI 25.75 kg/m² Physical Exam:   PHYSICAL EXAMINATION    Constitutional  · Appearance: well-nourished, well developed, alert, in no acute distress    HENT  · Head  · Face: appears normal  · Eyes: appear normal  · Ears: normal  · Mouth: normal  · Lips: no lesions    Neck  · Inspection/Palpation: normal appearance, no masses or tenderness  · Lymph Nodes: no lymphadenopathy present  · Thyroid: gland size normal, nontender, no nodules or masses present on palpation    Chest  · Respiratory Effort: breathing unlabored  · Auscultation: normal breath sounds    Cardiovascular  · Heart:  · Auscultation: regular rate and rhythm without murmur    Breasts  · Inspection of Breasts: breasts symmetrical, no skin changes, no discharge present, nipple appearance normal, no skin retraction present  · Palpation of Breasts and Axillae: no masses present on palpation, no breast tenderness  · Axillary Lymph Nodes: no lymphadenopathy present    Gastrointestinal  · Abdominal Examination: abdomen non-tender to palpation, normal bowel sounds, no masses present  · Liver and spleen: no hepatomegaly present, spleen not palpable  · Hernias: no hernias identified    Genitourinary  · External Genitalia: normal appearance for age, no discharge present, no tenderness present, no inflammatory lesions present, no masses present, no atrophy present  · Vagina: normal vaginal vault without central or paravaginal defects, no discharge present, no inflammatory lesions present, no masses present  · Bladder: non-tender to palpation  · Urethra: appears normal  · Cervix: normal   · Uterus: enlarged ~8w, normal shape, soft  · Adnexa: no adnexal tenderness present, no adnexal masses present  · Perineum: perineum within normal limits, no evidence of trauma, no rashes or skin lesions present  · Anus: anus within normal limits, no hemorrhoids present  · Inguinal Lymph Nodes: no lymphadenopathy present    Skin  · General Inspection: no rash, no lesions identified    Neurologic/Psychiatric  · Mental Status:  · Orientation: grossly oriented to person, place and time  · Mood and Affect: mood normal, affect appropriate    Assessment:   Intrauterine pregnancy:  - U=D. LMP for datin/15/20 -> GUS=3/22/21  - plan panorama and horizon @ 12w visit  - sign up for classes  - nausea. Rec OTC B6, Unisom. eRX Diclegis, phenergan, zofran        Plan:     · Offered CF testing, CVS, Nuchal Translucency, MSAFP, amnio, and discussed NIPT  · Course of pregnancy discussed including visit schedule, routine U/S, glucola testing, etc.  · Avoid alcoholic beverages and illicit/recreational drugs use  · Take prenatal vitamins or folic acid daily. · Hospital and practice style discussed with coverage system. · Discussed nutrition, toxoplasmosis precautions, sexual activity, exercise, need for influenza vaccine, environmental and work hazards, travel advice, screen for domestic violence, need for seat belts. · Discussed seafood, unpasteurized dairy products, deli meat, artificial sweeteners, and caffeine. · Information on prenatal classes/breastfeeding given. · Patient encouraged not to smoke. · Discussed current prescription drug use. Given medication list.  · Discussed the use of over the counter medications and chemicals. · Pt understands risk of hemorrhage during pregnancy and post delivery and would accept blood products if necessary in life-threatening emergencies      Handouts given to pt. Orders Placed This Encounter    CULTURE, URINE    CT/NG/T.VAGINALIS AMPLIFICATION     Order Specific Question:   Specimen source     Answer:   Vaginal [776]    HEP B SURFACE AG    HIV SCREEN, [facility]. W/REFLEX CONFIRM    CBC W/O DIFF    RUBELLA AB, IGG    T PALLIDUM SCREEN W/REFLEX    TYPE & SCREEN     ENTER SURGERY DATE IF FOR PRE-OP TESTING. Order Specific Question:   Has patient been transfused or pregnant in the last 3 mos. ?      Answer:   Unknown    doxylamine-pyridoxine, vit B6, (Diclegis) 10-10 mg TbEC DR tablet     Si tabs at bedtime, then 1 tab in AM if needed, then 1 tab in afternoon if needed. Max 4 tabs/d     Dispense:  120 Tab     Refill:  2    promethazine (PHENERGAN) 25 mg tablet     Sig: Take 1 Tab by mouth every six (6) hours as needed for Nausea. Dispense:  30 Tab     Refill:  1    ondansetron (Zofran ODT) 4 mg disintegrating tablet     Sig: Take 1 Tab by mouth every eight (8) hours as needed for Nausea.      Dispense:  30 Tab     Refill:  1

## 2020-08-14 ENCOUNTER — OFFICE VISIT (OUTPATIENT)
Dept: OBGYN CLINIC | Age: 25
End: 2020-08-14
Payer: COMMERCIAL

## 2020-08-14 VITALS
BODY MASS INDEX: 25.61 KG/M2 | HEIGHT: 64 IN | DIASTOLIC BLOOD PRESSURE: 72 MMHG | SYSTOLIC BLOOD PRESSURE: 118 MMHG | WEIGHT: 150 LBS

## 2020-08-14 DIAGNOSIS — Z34.91 ENCOUNTER FOR SUPERVISION OF NORMAL PREGNANCY IN FIRST TRIMESTER, UNSPECIFIED GRAVIDITY: ICD-10-CM

## 2020-08-14 DIAGNOSIS — N92.6 MISSED MENSES: Primary | ICD-10-CM

## 2020-08-14 PROCEDURE — 0502F SUBSEQUENT PRENATAL CARE: CPT | Performed by: OBSTETRICS & GYNECOLOGY

## 2020-08-14 PROCEDURE — 76805 OB US >/= 14 WKS SNGL FETUS: CPT | Performed by: OBSTETRICS & GYNECOLOGY

## 2020-08-14 RX ORDER — DOXYLAMINE SUCCINATE AND PYRIDOXINE HYDROCHLORIDE, DELAYED RELEASE TABLETS 10 MG/10 MG 10; 10 MG/1; MG/1
TABLET, DELAYED RELEASE ORAL
Qty: 120 TAB | Refills: 2 | Status: SHIPPED | OUTPATIENT
Start: 2020-08-14

## 2020-08-14 RX ORDER — ONDANSETRON 4 MG/1
4 TABLET, ORALLY DISINTEGRATING ORAL
Qty: 30 TAB | Refills: 1 | Status: SHIPPED | OUTPATIENT
Start: 2020-08-14

## 2020-08-14 RX ORDER — PROMETHAZINE HYDROCHLORIDE 25 MG/1
25 TABLET ORAL
Qty: 30 TAB | Refills: 1 | Status: SHIPPED | OUTPATIENT
Start: 2020-08-14

## 2020-08-18 LAB
ABO GROUP BLD: NORMAL
BACTERIA UR CULT: NO GROWTH
BLD GP AB SCN SERPL QL: NEGATIVE
ERYTHROCYTE [DISTWIDTH] IN BLOOD BY AUTOMATED COUNT: 12.2 % (ref 11.7–15.4)
HBV SURFACE AG SERPL QL IA: NEGATIVE
HCT VFR BLD AUTO: 40.1 % (ref 34–46.6)
HGB BLD-MCNC: 13.7 G/DL (ref 11.1–15.9)
HIV 1+2 AB+HIV1 P24 AG SERPL QL IA: NON REACTIVE
MCH RBC QN AUTO: 29.8 PG (ref 26.6–33)
MCHC RBC AUTO-ENTMCNC: 34.2 G/DL (ref 31.5–35.7)
MCV RBC AUTO: 87 FL (ref 79–97)
PLATELET # BLD AUTO: 283 X10E3/UL (ref 150–450)
RBC # BLD AUTO: 4.6 X10E6/UL (ref 3.77–5.28)
RH BLD: POSITIVE
RUBV IGG SERPL IA-ACNC: 2.79 INDEX
TREPONEMA PALLIDUM IGG+IGM AB [PRESENCE] IN SERUM OR PLASMA BY IMMUNOASSAY: NON REACTIVE
WBC # BLD AUTO: 8.5 X10E3/UL (ref 3.4–10.8)

## 2020-08-20 LAB
C TRACH RRNA SPEC QL NAA+PROBE: NEGATIVE
CHLAMYDIA, EXTERNAL: NORMAL
HBSAG, EXTERNAL: NORMAL
HCT, EXTERNAL: 40.1
HGB, EXTERNAL: 13.7
HIV, EXTERNAL: NON REACTIVE
N GONORRHOEA RRNA SPEC QL NAA+PROBE: NEGATIVE
N. GONORRHEA, EXTERNAL: NORMAL
PLATELET CNT,   EXTERNAL: 283
RUBELLA, EXTERNAL: NORMAL
RUBELLA, EXTERNAL: NORMAL
T VAGINALIS DNA SPEC QL NAA+PROBE: NEGATIVE
T. PALLIDUM, EXTERNAL: NON REACTIVE
T. PALLIDUM, EXTERNAL: NORMAL
TYPE, ABO & RH, EXTERNAL: NORMAL
URINALYSIS, EXTERNAL: NORMAL

## 2020-09-08 ENCOUNTER — ROUTINE PRENATAL (OUTPATIENT)
Dept: OBGYN CLINIC | Age: 25
End: 2020-09-08
Payer: COMMERCIAL

## 2020-09-08 VITALS
SYSTOLIC BLOOD PRESSURE: 111 MMHG | BODY MASS INDEX: 25.61 KG/M2 | WEIGHT: 150 LBS | DIASTOLIC BLOOD PRESSURE: 70 MMHG | HEIGHT: 64 IN

## 2020-09-08 DIAGNOSIS — Z34.80 SUPERVISION OF OTHER NORMAL PREGNANCY: Primary | ICD-10-CM

## 2020-09-08 DIAGNOSIS — Z13.79 ENCOUNTER FOR GENETIC SCREENING FOR DOWN SYNDROME: ICD-10-CM

## 2020-09-08 PROBLEM — Z34.90 PREGNANCY: Status: ACTIVE | Noted: 2020-09-08

## 2020-09-08 LAB — NIPT, EXTERNAL: NORMAL

## 2020-09-08 PROCEDURE — 0502F SUBSEQUENT PRENATAL CARE: CPT | Performed by: OBSTETRICS & GYNECOLOGY

## 2020-09-08 NOTE — PROGRESS NOTES
Still some nausea, but improving. Has been using OTC preggie pops. +HA: has h/o migraines; has not taken anything, better with sleep. Reviewed OTC meds including tylenol prn (no NSAIDs), Mg, H2O, antihistamines. Panorama today, declines Horizon. RTO 4wks with AFP.

## 2020-09-23 ENCOUNTER — TELEPHONE (OUTPATIENT)
Dept: OBGYN CLINIC | Age: 25
End: 2020-09-23

## 2020-09-23 DIAGNOSIS — Z34.90 PREGNANCY, UNSPECIFIED GESTATIONAL AGE: ICD-10-CM

## 2020-10-06 ENCOUNTER — ROUTINE PRENATAL (OUTPATIENT)
Dept: OBGYN CLINIC | Age: 25
End: 2020-10-06

## 2020-10-06 VITALS
HEIGHT: 64 IN | SYSTOLIC BLOOD PRESSURE: 92 MMHG | WEIGHT: 152.4 LBS | BODY MASS INDEX: 26.02 KG/M2 | DIASTOLIC BLOOD PRESSURE: 53 MMHG

## 2020-10-06 DIAGNOSIS — Z34.82 ENCOUNTER FOR SUPERVISION OF OTHER NORMAL PREGNANCY IN SECOND TRIMESTER: Primary | ICD-10-CM

## 2020-10-06 PROCEDURE — 0502F SUBSEQUENT PRENATAL CARE: CPT | Performed by: OBSTETRICS & GYNECOLOGY

## 2020-10-06 NOTE — PATIENT INSTRUCTIONS

## 2020-11-02 ENCOUNTER — ROUTINE PRENATAL (OUTPATIENT)
Dept: OBGYN CLINIC | Age: 25
End: 2020-11-02

## 2020-11-02 VITALS — SYSTOLIC BLOOD PRESSURE: 116 MMHG | DIASTOLIC BLOOD PRESSURE: 73 MMHG | WEIGHT: 157 LBS | BODY MASS INDEX: 26.95 KG/M2

## 2020-11-02 DIAGNOSIS — Z23 NEEDS FLU SHOT: ICD-10-CM

## 2020-11-02 DIAGNOSIS — Z34.80 SUPERVISION OF OTHER NORMAL PREGNANCY: Primary | ICD-10-CM

## 2020-11-02 PROCEDURE — 76805 OB US >/= 14 WKS SNGL FETUS: CPT | Performed by: OBSTETRICS & GYNECOLOGY

## 2020-11-02 PROCEDURE — 90471 IMMUNIZATION ADMIN: CPT | Performed by: OBSTETRICS & GYNECOLOGY

## 2020-11-02 PROCEDURE — 0502F SUBSEQUENT PRENATAL CARE: CPT | Performed by: OBSTETRICS & GYNECOLOGY

## 2020-11-02 PROCEDURE — 90686 IIV4 VACC NO PRSV 0.5 ML IM: CPT | Performed by: OBSTETRICS & GYNECOLOGY

## 2020-11-02 NOTE — PROGRESS NOTES
After obtaining consent, and per orders of dr Mario Boateng, injection of fluarix 0.5ml given in left arm by Phyllis Whaley RN. Patient instructed to remain in clinic for 20 minutes afterwards, and to report any adverse reaction to me immediately. Lot: [id number] Exp: 6/30/21 NDC: 69407-576-81 , VIS given.

## 2020-11-02 NOTE — PATIENT INSTRUCTIONS
Vaccine Information Statement Influenza (Flu) Vaccine (Inactivated or Recombinant): What You Need to Know Many Vaccine Information Statements are available in Sami and other languages. See www.immunize.org/vis Hojas de información sobre vacunas están disponibles en español y en muchos otros idiomas. Visite www.immunize.org/vis 1. Why get vaccinated? Influenza vaccine can prevent influenza (flu). Flu is a contagious disease that spreads around the [facility] every year, usually between October and May. Anyone can get the flu, but it is more dangerous for some people. Infants and young children, people 72years of age and older, pregnant women, and people with certain health conditions or a weakened immune system are at greatest risk of flu complications. Pneumonia, bronchitis, sinus infections and ear infections are examples of flu-related complications. If you have a medical condition, such as heart disease, cancer or diabetes, flu can make it worse. Flu can cause fever and chills, sore throat, muscle aches, fatigue, cough, headache, and runny or stuffy nose. Some people may have vomiting and diarrhea, though this is more common in children than adults. Each year thousands of people in the [facility] die from flu, and many more are hospitalized. Flu vaccine prevents millions of illnesses and flu-related visits to the doctor each year. 2. Influenza vaccines CDC recommends everyone 10months of age and older get vaccinated every flu season. Children 6 months through 6years of age may need 2 doses during a single flu season. Everyone else needs only 1 dose each flu season. It takes about 2 weeks for protection to develop after vaccination. There are many flu viruses, and they are always changing. Each year a new flu vaccine is made to protect against three or four viruses that are likely to cause disease in the upcoming flu season.  Even when the vaccine doesnt exactly match these viruses, it may still provide some protection. Influenza vaccine does not cause flu. Influenza vaccine may be given at the same time as other vaccines. 3. Talk with your health care provider Tell your vaccine provider if the person getting the vaccine: 
 Has had an allergic reaction after a previous dose of influenza vaccine, or has any severe, life-threatening allergies.  Has ever had Guillain-Barré Syndrome (also called GBS). In some cases, your health care provider may decide to postpone influenza vaccination to a future visit. People with minor illnesses, such as a cold, may be vaccinated. People who are moderately or severely ill should usually wait until they recover before getting influenza vaccine. Your health care provider can give you more information. 4. Risks of a reaction  Soreness, redness, and swelling where shot is given, fever, muscle aches, and headache can happen after influenza vaccine.  There may be a very small increased risk of Guillain-Barré Syndrome (GBS) after inactivated influenza vaccine (the flu shot). [facility] children who get the flu shot along with pneumococcal vaccine (PCV13), and/or DTaP vaccine at the same time might be slightly more likely to have a seizure caused by fever. Tell your health care provider if a child who is getting flu vaccine has ever had a seizure. People sometimes faint after medical procedures, including vaccination. Tell your provider if you feel dizzy or have vision changes or ringing in the ears. As with any medicine, there is a very remote chance of a vaccine causing a severe allergic reaction, other serious injury, or death. 5. What if there is a serious problem? An allergic reaction could occur after the vaccinated person leaves the clinic.  If you see signs of a severe allergic reaction (hives, swelling of the face and throat, difficulty breathing, a fast heartbeat, dizziness, or weakness), call 9-1-1 and get the person to the nearest hospital. 
 
For other signs that concern you, call your health care provider. Adverse reactions should be reported to the Vaccine Adverse Event Reporting System (VAERS). Your health care provider will usually file this report, or you can do it yourself. Visit the VAERS website at www.vaers. hhs.gov or call 3-899.224.1045. VAERS is only for reporting reactions, and VAERS staff do not give medical advice. 6. The National Vaccine Injury Compensation Program 
 
The [facility] Vaccine Injury Compensation Program (VICP) is a federal program that was created to compensate people who may have been injured by certain vaccines. Visit the VICP website at www.hrsa.gov/vaccinecompensation or call 2-858.996.1492 to learn about the program and about filing a claim. There is a time limit to file a claim for compensation. 7. How can I learn more?  Ask your health care provider.  Call your local or state health department.  Contact the Centers for Disease Control and Prevention (CDC): 
- Call 6-680.294.3582 (1-800-CDC-INFO) or 
- Visit CDCs influenza website at www.cdc.gov/flu Vaccine Information Statement (Interim) Inactivated Influenza Vaccine 8/15/2019 
42 VERO Little [id number]-27 Department of Health and [place] Centers for Disease Control and Prevention Office Use Only

## 2022-03-19 PROBLEM — Z34.90 PREGNANCY: Status: ACTIVE | Noted: 2020-09-08

## 2024-03-19 ENCOUNTER — HOSPITAL ENCOUNTER (OUTPATIENT)
Facility: HOSPITAL | Age: 29
Setting detail: RECURRING SERIES
Discharge: HOME | End: 2024-03-22
Payer: COMMERCIAL

## 2024-03-19 PROCEDURE — 97162 PT EVAL MOD COMPLEX 30 MIN: CPT

## 2024-03-19 NOTE — PROGRESS NOTES
[facility]  [address] [place] [address]  Ph [phone]  Fx [phone]    Plan of Care/ Statement of Necessity for Physical Therapy Services    Patient name: Cornelia Cortez Start of Care: 3/19/2024   Referral source: Edith Meganmelanieroccoimani LAWTON, * : 1995    Medical Diagnosis: Other symptoms and signs involving the genitourinary system [R39.89]   Onset Date: 2024    Treatment Diagnosis: Other symptoms and signs involving the genitourinary system [R39.89]   Prior Hospitalization: see medical history Provider#: [id number]   Medications: Verified on Patient summary List    Comorbidities: 3 pregnancies: 1 miscarriage D & C, vaginal birth , currently 26 weeks pregnant due 2024; anxiety and depression    Prior Level of Function: no pain           The Plan of Care and following information is based on the information from the initial evaluation.  Assessment/ key information: Patient is a 28 year old female presenting to Physical Therapy with c/o symptoms related to round ligament pain and constipation during pregnancy which is limiting ability function at previous level. Patient is currently 26 weeks pregnant, due . Patient has increased pain complaints with walking, getting out of car, and transferring from bed. Patient presents with decreased strength, coordination, and endurance of the pelvic floor muscles and decreased strength of postural stabilizers. Patient presents with fair understanding of bladder and bowel anatomy/function, dietary irritants, and urge suppression. I feel patient would benefit from skilled therapeutic intervention to optimize highest functional level possible.     Patient will continue to benefit from skilled PT services to modify and progress therapeutic interventions, address functional mobility deficits, address ROM deficits, address strength deficits, analyze and address soft tissue restrictions, analyze and cue movement 
goals / Updated goals:  Short term goals: To be achieved in 6 treatments::    Pt demonstrates proper dietary/fluid habits & urge suppression strategies that promote bladder & bowel health to aid in management of urinary urgency & incontinence.  Eval: Pt consuming insufficient amount of water & unaware of bladder fitness, dietary irritants & urge suppression strategies.     Patient will report adherence to HEP as recommended for active participation and progression of interventions during therapy.   Eval: initiate at next visit    Patient will demonstrate/verbalize correct pressure management techniques during exercises and ADLs to decreased strain on groin/pelvic floor contributing to symptoms.   Eval: patient unaware of pressure management     Patient will report decreased pain to 2/10 during or after ADLs including walking, getting out of car, transferring from bed.   Eval: patient reports average daily pain 5, best day 0, worst pain 7    Patient will demonstrate/verbalize appropriate toileting postures, modifications, and breathing techniques to reduce strain on pelvic floor and organs contributing to symptoms.   Eval: patient unaware of toileting modifications to reduce strain; patient reports straining frequency of  less than 3 per week    Long term goals: To be achieved in 12 treatments::    Patient will report pain at worst to 4/10 to return to prior level of function.   Eval: patient reports average daily pain 5, best day 0, worst pain 7    Patient will report 90% improvement in bowel dysfunction of constipation and Tivoli scale 3-5 to improve QOL.   Eval: Patient reports bowel dysfunction of constipation and Tivoli scale typically 4, sometimes 1     Per FOTO, patient will report little to no difficulty with doing household chores (cooking, cleaning, laundry).  Eval: FOTO moderate difficulty  FOTO score=established functional score where 100=no disability    Pt demonstrates independence with management

## 2024-04-04 ENCOUNTER — HOSPITAL ENCOUNTER (OUTPATIENT)
Facility: HOSPITAL | Age: 29
Setting detail: RECURRING SERIES
Discharge: HOME | End: 2024-04-07
Payer: COMMERCIAL

## 2024-04-04 PROCEDURE — 97112 NEUROMUSCULAR REEDUCATION: CPT

## 2024-04-04 PROCEDURE — 97530 THERAPEUTIC ACTIVITIES: CPT

## 2024-04-04 PROCEDURE — 97535 SELF CARE MNGMENT TRAINING: CPT

## 2024-04-04 NOTE — PROGRESS NOTES
PHYSICAL / OCCUPATIONAL THERAPY - DAILY TREATMENT NOTE     Patient Name: Cornelia Cortez    Date: 2024    : 1995  Insurance: Payor: POONAM / Plan: POONAM MONTOYA HMO / Product Type: *No Product type* /      Patient  verified Yes     Visit #   Current / Total 2 12   Time   In / Out 1:51 2:31   Pain   In / Out 0 0   Subjective Functional Status/Changes: Patient reports left sided groin pain after prolonged walking yesterday and daily BM without straining. Will be 29 weeks pregnant this .   Changes to:  Allergies, Med Hx, Sx Hx?   no       TREATMENT AREA =  Other symptoms and signs involving the genitourinary system [R39.89]    OBJECTIVE      Therapeutic Procedures:  Tx Min Billable or 1:1 Min (if diff from Tx Min) Procedure, Rationale, Specifics   23  45211 Neuromuscular Re-Education (timed):  improve balance, coordination, kinesthetic sense, posture, core stability and proprioception to improve patient's ability to develop conscious control of individual muscles and awareness of position of extremities in order to progress to PLOF and address remaining functional goals. (see flow sheet as applicable)    Details if applicable:       9  44242 Therapeutic Activity (timed):  use of dynamic activities replicating functional movements to increase ROM, strength, coordination, balance, and proprioception in order to improve patient's ability to progress to PLOF and address remaining functional goals.  (see flow sheet as applicable)    Details if applicable:     8  01467 Self Care/Home Management (timed):  improve patient knowledge and understanding of pain reducing techniques, positioning, posture/ergonomics, home safety, and activity modification  to improve patient's ability to progress to PLOF and address remaining functional goals.  (see flow sheet as applicable)     Details if applicable:  support belt, HEP         Details if applicable:            Details if applicable:     40  MC BC Totals

## 2024-04-11 ENCOUNTER — TELEPHONE (OUTPATIENT)
Facility: HOSPITAL | Age: 29
End: 2024-04-11

## 2024-04-18 ENCOUNTER — HOSPITAL ENCOUNTER (OUTPATIENT)
Facility: HOSPITAL | Age: 29
Setting detail: RECURRING SERIES
End: 2024-04-18
Payer: COMMERCIAL

## 2024-04-18 ENCOUNTER — TELEPHONE (OUTPATIENT)
Facility: HOSPITAL | Age: 29
End: 2024-04-18

## 2024-04-25 ENCOUNTER — HOSPITAL ENCOUNTER (OUTPATIENT)
Facility: HOSPITAL | Age: 29
Setting detail: RECURRING SERIES
Discharge: HOME | End: 2024-04-28
Payer: COMMERCIAL

## 2024-04-25 PROCEDURE — 97530 THERAPEUTIC ACTIVITIES: CPT

## 2024-04-25 PROCEDURE — 97110 THERAPEUTIC EXERCISES: CPT

## 2024-04-25 PROCEDURE — 97112 NEUROMUSCULAR REEDUCATION: CPT

## 2024-04-25 PROCEDURE — 97535 SELF CARE MNGMENT TRAINING: CPT

## 2024-04-25 NOTE — PROGRESS NOTES
[facility]  [address] [place] [address]  Ph [phone]  Fx [phone]    Physical Therapy Progress Note  Patient name: Cornelia Cortez Start of Care: 3/19/2024   Referral source: Keshia Sharma, * : 1995               Medical Diagnosis: Other symptoms and signs involving the genitourinary system [R39.89]    Onset Date: 2024               Treatment Diagnosis: Other symptoms and signs involving the genitourinary system [R39.89]   Prior Hospitalization: see medical history Provider#: [id number]   Medications: Verified on Patient summary List    Comorbidities: 3 pregnancies: 1 miscarriage D & C, vaginal birth , currently 26 weeks pregnant due 2024; anxiety and depression    Prior Level of Function: no pain     Visits from Start of Care: 3    Missed Visits: 2    Updated Goals/Measure of Progress:     Short term goals: To be achieved in 6 treatments::     Pt demonstrates proper dietary/fluid habits & urge suppression strategies that promote bladder & bowel health to aid in management of urinary urgency & incontinence.  Eval: Pt consuming insufficient amount of water & unaware of bladder fitness, dietary irritants & urge suppression strategies.   PN : 45 oz of water per day     Patient will report adherence to HEP as recommended for active participation and progression of interventions during therapy.   Eval: initiate at next visit  : HEP initiated  PN : patient reports semi compliance with HEP     Patient will demonstrate/verbalize correct pressure management techniques during exercises and ADLs to decreased strain on groin/pelvic floor contributing to symptoms.   Eval: patient unaware of pressure management   PN : patient reports using support belt as needed PROGRESSING     Patient will report decreased pain to 2/10 during or after ADLs including walking, getting out of car, transferring from bed.   Eval: patient reports average daily

## 2024-04-25 NOTE — PROGRESS NOTES
PHYSICAL / OCCUPATIONAL THERAPY - DAILY TREATMENT NOTE     Patient Name: Cornelia Cortez    Date: 2024    : 1995  Insurance: Payor: OPONAM / Plan: POONAM MONTOYA HMO / Product Type: *No Product type* /      Patient  verified Yes     Visit #   Current / Total 3 12   Time   In / Out 2:18 2:52   Pain   In / Out 0 0   Subjective Functional Status/Changes: Patient reports decreased pain, more at night than day time. Reports currently 31 weeks pregnant.   Changes to:  Allergies, Med Hx, Sx Hx?   no       TREATMENT AREA =  Other symptoms and signs involving the genitourinary system [R39.89]    OBJECTIVE      Therapeutic Procedures:  Tx Min Billable or 1:1 Min (if diff from Tx Min) Procedure, Rationale, Specifics   8  67502 Therapeutic Exercise (timed):  increase ROM, strength, coordination, balance, and proprioception to improve patient's ability to progress to PLOF and address remaining functional goals. (see flow sheet as applicable)    Details if applicable:       10  08545 Neuromuscular Re-Education (timed):  improve balance, coordination, kinesthetic sense, posture, core stability and proprioception to improve patient's ability to develop conscious control of individual muscles and awareness of position of extremities in order to progress to PLOF and address remaining functional goals. (see flow sheet as applicable)    Details if applicable:     8  02842 Therapeutic Activity (timed):  use of dynamic activities replicating functional movements to increase ROM, strength, coordination, balance, and proprioception in order to improve patient's ability to progress to PLOF and address remaining functional goals.  (see flow sheet as applicable)     Details if applicable:     8  90103 Self Care/Home Management (timed):  improve patient knowledge and understanding of pain reducing techniques, positioning, posture/ergonomics, home safety, and activity modification  to improve patient's ability to progress to

## 2024-05-02 ENCOUNTER — HOSPITAL ENCOUNTER (OUTPATIENT)
Facility: HOSPITAL | Age: 29
Setting detail: RECURRING SERIES
Discharge: HOME | End: 2024-05-05
Payer: COMMERCIAL

## 2024-05-02 PROCEDURE — 97112 NEUROMUSCULAR REEDUCATION: CPT

## 2024-05-02 PROCEDURE — 97110 THERAPEUTIC EXERCISES: CPT

## 2024-05-02 PROCEDURE — 97530 THERAPEUTIC ACTIVITIES: CPT

## 2024-05-02 PROCEDURE — 97535 SELF CARE MNGMENT TRAINING: CPT

## 2024-05-02 NOTE — PROGRESS NOTES
PHYSICAL / OCCUPATIONAL THERAPY - DAILY TREATMENT NOTE     Patient Name: Cornelia Cortez    Date: 2024    : 1995  Insurance: Payor: POONAM / Plan: POONAM MONTOYA HMO / Product Type: *No Product type* /      Patient  verified Yes     Visit #   Current / Total 4 12   Time   In / Out 11:50 12:25   Pain   In / Out 0 0   Subjective Functional Status/Changes: Patient reports she saw OB this morning and Kerwin Jacobs started yesterday. Currently 32 weeks pregnant.   Changes to:  Allergies, Med Hx, Sx Hx?   no       TREATMENT AREA =  Other symptoms and signs involving the genitourinary system [R39.89]    OBJECTIVE      Therapeutic Procedures:  Tx Min Billable or 1:1 Min (if diff from Tx Min) Procedure, Rationale, Specifics   8  67467 Therapeutic Exercise (timed):  increase ROM, strength, coordination, balance, and proprioception to improve patient's ability to progress to PLOF and address remaining functional goals. (see flow sheet as applicable)    Details if applicable:       9  55153 Neuromuscular Re-Education (timed):  improve balance, coordination, kinesthetic sense, posture, core stability and proprioception to improve patient's ability to develop conscious control of individual muscles and awareness of position of extremities in order to progress to PLOF and address remaining functional goals. (see flow sheet as applicable)    Details if applicable:     10  96130 Therapeutic Activity (timed):  use of dynamic activities replicating functional movements to increase ROM, strength, coordination, balance, and proprioception in order to improve patient's ability to progress to PLOF and address remaining functional goals.  (see flow sheet as applicable)     Details if applicable:     8  69431 Self Care/Home Management (timed):  improve patient knowledge and understanding of pain reducing techniques, positioning, posture/ergonomics, home safety, and activity modification  to improve patient's ability to

## 2024-05-09 ENCOUNTER — TELEPHONE (OUTPATIENT)
Facility: HOSPITAL | Age: 29
End: 2024-05-09

## 2024-05-16 ENCOUNTER — TELEPHONE (OUTPATIENT)
Facility: HOSPITAL | Age: 29
End: 2024-05-16

## 2024-05-16 NOTE — TELEPHONE ENCOUNTER
JOSEPH. Therapist called and spoke to sarahi. She thought appt. was this afternoon. Confirmed appt. next week. Reminded of attendance policy and she will be taken off schedule with 1 more NS/Cxl.

## 2024-05-23 ENCOUNTER — APPOINTMENT (OUTPATIENT)
Facility: HOSPITAL | Age: 29
End: 2024-05-23
Payer: COMMERCIAL

## 2024-05-30 ENCOUNTER — APPOINTMENT (OUTPATIENT)
Facility: HOSPITAL | Age: 29
End: 2024-05-30
Payer: COMMERCIAL

## 2024-06-01 ENCOUNTER — HOSPITAL ENCOUNTER (OUTPATIENT)
Facility: HOSPITAL | Age: 29
Discharge: HOME | End: 2024-06-01
Attending: OBSTETRICS & GYNECOLOGY | Admitting: OBSTETRICS & GYNECOLOGY
Payer: COMMERCIAL

## 2024-06-01 VITALS
BODY MASS INDEX: 31.41 KG/M2 | RESPIRATION RATE: 18 BRPM | OXYGEN SATURATION: 97 % | HEIGHT: 64 IN | HEART RATE: 108 BPM | DIASTOLIC BLOOD PRESSURE: 74 MMHG | SYSTOLIC BLOOD PRESSURE: 108 MMHG | WEIGHT: 184 LBS | TEMPERATURE: 98.4 F

## 2024-06-01 PROBLEM — O36.8190 DECREASED FETAL MOVEMENT AFFECTING MANAGEMENT OF MOTHER, ANTEPARTUM: Status: ACTIVE | Noted: 2024-06-01

## 2024-06-01 PROCEDURE — 59025 FETAL NON-STRESS TEST: CPT

## 2024-06-01 PROCEDURE — 99202 OFFICE O/P NEW SF 15 MIN: CPT

## 2024-06-01 NOTE — PROGRESS NOTES
1632- Pt arrived to LD c/o decreased fetal movement. Pt is  and is 36w6d. Pt denies pain and vaginal bleeding and LOF. Pt taken to triage 1 and oriented to space.     1640- EFM and TOCO applied. Documentation complete.     1500- Strip reviewed w/CNM Loulou and deemed reactive. Discharge orders given.     1505- Discharge instructions reviewed w/Pt and all questions answered.     1507- Pt ambulated off of labor unit.    1921: TRANSFER - IN REPORT: 
 
Verbal report received from [address] [place] [address] (name) on Trenton Aguirre  being received from [facility] (unit) for routine progression of care Report consisted of patients Situation, Background, Assessment and  
Recommendations(SBAR). Information from the following report(s) SBAR, Kardex, Intake/Output and MAR was reviewed with the receiving nurse. Opportunity for questions and clarification was provided. Assessment completed upon patients arrival to unit and care assumed. Bedside shift change report given to Yakelin (oncoming nurse) by Arielle Hooker (offgoing nurse). Report included the following information SBAR, Kardex, Intake/Output and MAR.

## 2024-06-10 ENCOUNTER — HOSPITAL ENCOUNTER (OUTPATIENT)
Facility: HOSPITAL | Age: 29
Discharge: HOME | End: 2024-06-10
Attending: OBSTETRICS & GYNECOLOGY | Admitting: OBSTETRICS & GYNECOLOGY
Payer: COMMERCIAL

## 2024-06-10 VITALS
TEMPERATURE: 98.6 F | HEART RATE: 103 BPM | SYSTOLIC BLOOD PRESSURE: 111 MMHG | WEIGHT: 189 LBS | OXYGEN SATURATION: 98 % | DIASTOLIC BLOOD PRESSURE: 60 MMHG | RESPIRATION RATE: 16 BRPM | BODY MASS INDEX: 32.27 KG/M2 | HEIGHT: 64 IN

## 2024-06-10 PROBLEM — Z34.90 INTRAUTERINE PREGNANCY: Status: ACTIVE | Noted: 2024-06-10

## 2024-06-10 PROCEDURE — 6370000000 HC RX 637 (ALT 250 FOR IP)

## 2024-06-10 PROCEDURE — 99282 EMERGENCY DEPT VISIT SF MDM: CPT

## 2024-06-10 PROCEDURE — 59025 FETAL NON-STRESS TEST: CPT

## 2024-06-10 RX ORDER — ONDANSETRON 4 MG/1
4 TABLET, ORALLY DISINTEGRATING ORAL ONCE
Status: COMPLETED | OUTPATIENT
Start: 2024-06-10 | End: 2024-06-10

## 2024-06-10 RX ADMIN — ONDANSETRON 4 MG: 4 TABLET, ORALLY DISINTEGRATING ORAL at 11:19

## 2024-06-10 NOTE — PROGRESS NOTES
1015 : pt ambulated to unit with complaints of ctx,  38w1d. Pt states ctx are q10min. Pt endorses +FM, denies, LOF, RUQ pain, headache, visual changes, VSS. EFM and TOCO applied. Triage datebase completed. SVE 1/thick/.     1110 : pt feeling nauseous with little relief from ginger ale, CNM reviewed tracing, Okay'd to D/C home after Zofran.

## 2024-06-16 ENCOUNTER — HOSPITAL ENCOUNTER (INPATIENT)
Facility: HOSPITAL | Age: 29
LOS: 2 days | Discharge: HOME | End: 2024-06-18
Attending: OBSTETRICS & GYNECOLOGY | Admitting: OBSTETRICS & GYNECOLOGY
Payer: COMMERCIAL

## 2024-06-16 PROBLEM — Z33.1 IUP (INTRAUTERINE PREGNANCY), INCIDENTAL: Status: ACTIVE | Noted: 2024-06-16

## 2024-06-16 LAB
ABO + RH BLD: NORMAL
APPEARANCE UR: CLEAR
BILIRUB UR QL: NEGATIVE
BLOOD GROUP ANTIBODIES SERPL: NORMAL
COLOR UR: YELLOW
ERYTHROCYTE [DISTWIDTH] IN BLOOD BY AUTOMATED COUNT: 13.5 % (ref 11.6–14.5)
GLUCOSE UR QL STRIP.AUTO: NEGATIVE MG/DL
HCT VFR BLD AUTO: 34.2 % (ref 35–45)
HGB BLD-MCNC: 10.9 G/DL (ref 12–16)
KETONES UR-MCNC: 15 MG/DL
LEUKOCYTE ESTERASE UR QL STRIP: ABNORMAL
MCH RBC QN AUTO: 26.7 PG (ref 24–34)
MCHC RBC AUTO-ENTMCNC: 31.9 G/DL (ref 31–37)
MCV RBC AUTO: 83.6 FL (ref 78–100)
NITRITE UR QL: NEGATIVE
NRBC # BLD: 0 K/UL (ref 0–0.01)
NRBC BLD-RTO: 0 PER 100 WBC
PH UR: 6 (ref 5–9)
PLATELET # BLD AUTO: 302 K/UL (ref 135–420)
PMV BLD AUTO: 11.8 FL (ref 9.2–11.8)
PROT UR QL: NEGATIVE MG/DL
RBC # BLD AUTO: 4.09 M/UL (ref 4.2–5.3)
RBC # UR STRIP: NEGATIVE
SERVICE CMNT-IMP: ABNORMAL
SP GR UR: 1.02 (ref 1–1.02)
SPECIMEN EXP DATE BLD: NORMAL
UROBILINOGEN UR QL: 0.2 EU/DL (ref 0.2–1)
WBC # BLD AUTO: 10.8 K/UL (ref 4.6–13.2)

## 2024-06-16 PROCEDURE — 81003 URINALYSIS AUTO W/O SCOPE: CPT

## 2024-06-16 PROCEDURE — 59025 FETAL NON-STRESS TEST: CPT

## 2024-06-16 PROCEDURE — 2580000003 HC RX 258: Performed by: ADVANCED PRACTICE MIDWIFE

## 2024-06-16 PROCEDURE — 86850 RBC ANTIBODY SCREEN: CPT

## 2024-06-16 PROCEDURE — 86900 BLOOD TYPING SEROLOGIC ABO: CPT

## 2024-06-16 PROCEDURE — 6360000002 HC RX W HCPCS: Performed by: ADVANCED PRACTICE MIDWIFE

## 2024-06-16 PROCEDURE — C1726 CATH, BAL DIL, NON-VASCULAR: HCPCS

## 2024-06-16 PROCEDURE — 7210000100 HC LABOR FEE PER 1 HR

## 2024-06-16 PROCEDURE — 86901 BLOOD TYPING SEROLOGIC RH(D): CPT

## 2024-06-16 PROCEDURE — 1100000000 HC RM PRIVATE

## 2024-06-16 PROCEDURE — 85027 COMPLETE CBC AUTOMATED: CPT

## 2024-06-16 RX ORDER — SODIUM CHLORIDE, SODIUM LACTATE, POTASSIUM CHLORIDE, CALCIUM CHLORIDE 600; 310; 30; 20 MG/100ML; MG/100ML; MG/100ML; MG/100ML
INJECTION, SOLUTION INTRAVENOUS CONTINUOUS
Status: DISCONTINUED | OUTPATIENT
Start: 2024-06-16 | End: 2024-06-18 | Stop reason: HOSPADM

## 2024-06-16 RX ORDER — ONDANSETRON 2 MG/ML
4 INJECTION INTRAMUSCULAR; INTRAVENOUS EVERY 6 HOURS PRN
Status: DISCONTINUED | OUTPATIENT
Start: 2024-06-16 | End: 2024-06-18 | Stop reason: SDUPTHER

## 2024-06-16 RX ORDER — SODIUM CHLORIDE 0.9 % (FLUSH) 0.9 %
5-40 SYRINGE (ML) INJECTION PRN
Status: DISCONTINUED | OUTPATIENT
Start: 2024-06-16 | End: 2024-06-17 | Stop reason: HOSPADM

## 2024-06-16 RX ORDER — TRANEXAMIC ACID 10 MG/ML
1000 INJECTION, SOLUTION INTRAVENOUS
Status: DISPENSED | OUTPATIENT
Start: 2024-06-16 | End: 2024-06-17

## 2024-06-16 RX ORDER — ONDANSETRON 4 MG/1
4 TABLET, ORALLY DISINTEGRATING ORAL EVERY 6 HOURS PRN
Status: DISCONTINUED | OUTPATIENT
Start: 2024-06-16 | End: 2024-06-18 | Stop reason: SDUPTHER

## 2024-06-16 RX ORDER — SODIUM CHLORIDE, SODIUM LACTATE, POTASSIUM CHLORIDE, AND CALCIUM CHLORIDE .6; .31; .03; .02 G/100ML; G/100ML; G/100ML; G/100ML
500 INJECTION, SOLUTION INTRAVENOUS PRN
Status: DISCONTINUED | OUTPATIENT
Start: 2024-06-16 | End: 2024-06-17 | Stop reason: HOSPADM

## 2024-06-16 RX ORDER — METHYLERGONOVINE MALEATE 0.2 MG/ML
200 INJECTION INTRAVENOUS PRN
Status: DISCONTINUED | OUTPATIENT
Start: 2024-06-16 | End: 2024-06-18 | Stop reason: SDUPTHER

## 2024-06-16 RX ORDER — SODIUM CHLORIDE 0.9 % (FLUSH) 0.9 %
5-40 SYRINGE (ML) INJECTION EVERY 12 HOURS SCHEDULED
Status: DISCONTINUED | OUTPATIENT
Start: 2024-06-16 | End: 2024-06-17 | Stop reason: HOSPADM

## 2024-06-16 RX ORDER — CARBOPROST TROMETHAMINE 250 UG/ML
250 INJECTION, SOLUTION INTRAMUSCULAR PRN
Status: DISCONTINUED | OUTPATIENT
Start: 2024-06-16 | End: 2024-06-18 | Stop reason: SDUPTHER

## 2024-06-16 RX ORDER — MAGNESIUM CARB/ALUMINUM HYDROX 105-160MG
30 TABLET,CHEWABLE ORAL DAILY PRN
Status: DISCONTINUED | OUTPATIENT
Start: 2024-06-16 | End: 2024-06-17 | Stop reason: HOSPADM

## 2024-06-16 RX ORDER — SODIUM CHLORIDE 9 MG/ML
25 INJECTION, SOLUTION INTRAVENOUS PRN
Status: DISCONTINUED | OUTPATIENT
Start: 2024-06-16 | End: 2024-06-17 | Stop reason: HOSPADM

## 2024-06-16 RX ORDER — LIDOCAINE HYDROCHLORIDE 10 MG/ML
30 INJECTION, SOLUTION EPIDURAL; INFILTRATION; INTRACAUDAL; PERINEURAL PRN
Status: DISCONTINUED | OUTPATIENT
Start: 2024-06-16 | End: 2024-06-17 | Stop reason: HOSPADM

## 2024-06-16 RX ORDER — DOCUSATE SODIUM 100 MG/1
100 CAPSULE, LIQUID FILLED ORAL 2 TIMES DAILY
Status: DISCONTINUED | OUTPATIENT
Start: 2024-06-16 | End: 2024-06-18 | Stop reason: HOSPADM

## 2024-06-16 RX ORDER — TERBUTALINE SULFATE 1 MG/ML
0.25 INJECTION, SOLUTION SUBCUTANEOUS
Status: DISCONTINUED | OUTPATIENT
Start: 2024-06-16 | End: 2024-06-17 | Stop reason: HOSPADM

## 2024-06-16 RX ORDER — MISOPROSTOL 200 UG/1
400 TABLET ORAL PRN
Status: DISCONTINUED | OUTPATIENT
Start: 2024-06-16 | End: 2024-06-18 | Stop reason: HOSPADM

## 2024-06-16 RX ADMIN — SODIUM CHLORIDE, POTASSIUM CHLORIDE, SODIUM LACTATE AND CALCIUM CHLORIDE: 600; 310; 30; 20 INJECTION, SOLUTION INTRAVENOUS at 22:00

## 2024-06-16 RX ADMIN — BUTORPHANOL TARTRATE 1 MG: 2 INJECTION, SOLUTION INTRAMUSCULAR; INTRAVENOUS at 21:10

## 2024-06-16 RX ADMIN — Medication 2 MILLI-UNITS/MIN: at 22:42

## 2024-06-16 NOTE — H&P
History & Physical    Name: Cornelia Cortez MRN: [id number]  SSN:     YOB: 1995  Age: 28 y.o.  Sex: female      Subjective:     Estimated Date of Delivery: 24  OB History    Para Term  AB Living   3 1     1 1   SAB IAB Ectopic Molar Multiple Live Births                    # Outcome Date GA Lbr Chaitanya/2nd Weight Sex Delivery Anes PTL Lv   3 Current            2 AB            1 Para                Ms. Cortez is admitted with pregnancy at 39w0d for elective induction of labor. Prenatal course was normal.  with hx of LGA infant (9lbs 6oz) delivered at 40 weeks. Please see prenatal records for details.    Past Medical History:   Diagnosis Date    Postpartum depression      Past Surgical History:   Procedure Laterality Date    DILATION AND CURETTAGE OF UTERUS  2018    10 wk miscarriage     Social History     Occupational History    Not on file   Tobacco Use    Smoking status: Never    Smokeless tobacco: Never   Substance and Sexual Activity    Alcohol use: Not Currently    Drug use: Never    Sexual activity: Not Currently     Birth control/protection: None     Comment: twice per week (weekends)     History reviewed. No pertinent family history.    No Known Allergies  Prior to Admission medications    Medication Sig Start Date End Date Taking? Authorizing Provider   Prenatal MV-Min-Fe Fum-FA-DHA (PRENATAL 1 PO) Take by mouth    Provider, MD Olena   doxylamine-pyridoxine 10-10 MG TBEC 2 tabs at bedtime, then 1 tab in AM if needed, then 1 tab in afternoon if needed. Max 4 tabs/d  Patient not taking: Reported on 2024   Automatic Reconciliation, Ar   ondansetron (ZOFRAN-ODT) 4 MG disintegrating tablet Take 4 mg by mouth every 8 hours as needed  Patient not taking: Reported on 2024   Automatic Reconciliation, Ar   promethazine (PHENERGAN) 25 MG tablet Take 25 mg by mouth every 6 hours as needed  Patient not taking: Reported on 2024

## 2024-06-16 NOTE — PROGRESS NOTES
1723 Patient is a  at 39.0 weeks who presents to unit with complaints of tingling, intermittent swelling in hands. Sometimes radiating towards shoulder. Denies painful contractions, SROM, abnormal discharge or vaginal bleeding. Patient reports good fetal movement. No complications or concerns during the pregnancy.     1730 XANDER Craven updated on above information and assessment. Probable carpal tunnel.     1736 FHT reviewed with XANDER and MARIA TERESA Singh CNM

## 2024-06-16 NOTE — PROGRESS NOTES
Triage Progress Note      Patient presented to triage with reports of swelling, numbness and tingling in her hands. Reports that this has been going on for several days, rotating from hand to hand. Reports numbness/tinging that is brief and will spread up her arm. Patient [place] the [place] and drank plenty of water, is unable to urinate at this time.      Physical Exam:  Fetal Heart Rate: 140  Variability: moderate  Accelerations: present  Decelerations: late and variable  Uterine Activity: irregular ctx, mild to palpation  Upper Extremities: No numbness/tingling noted, trace swelling,  strength WNL bilaterally.    Assessment/Plan:  Encourage hydration to obtain UA. Will monitor FHR for an hour as reassess for continued plan of care.    XANDER Garcia

## 2024-06-17 ENCOUNTER — ANESTHESIA (OUTPATIENT)
Facility: HOSPITAL | Age: 29
End: 2024-06-17
Payer: COMMERCIAL

## 2024-06-17 ENCOUNTER — ANESTHESIA EVENT (OUTPATIENT)
Facility: HOSPITAL | Age: 29
End: 2024-06-17
Payer: COMMERCIAL

## 2024-06-17 PROCEDURE — 1100000000 HC RM PRIVATE

## 2024-06-17 PROCEDURE — 2500000003 HC RX 250 WO HCPCS: Performed by: ANESTHESIOLOGY

## 2024-06-17 PROCEDURE — 10907ZC DRAINAGE OF AMNIOTIC FLUID, THERAPEUTIC FROM PRODUCTS OF CONCEPTION, VIA NATURAL OR ARTIFICIAL OPENING: ICD-10-PCS | Performed by: OBSTETRICS & GYNECOLOGY

## 2024-06-17 PROCEDURE — 7220000101 HC DELIVERY VAGINAL/SINGLE

## 2024-06-17 PROCEDURE — 6370000000 HC RX 637 (ALT 250 FOR IP): Performed by: MIDWIFE

## 2024-06-17 PROCEDURE — 6360000002 HC RX W HCPCS: Performed by: ADVANCED PRACTICE MIDWIFE

## 2024-06-17 PROCEDURE — 6370000000 HC RX 637 (ALT 250 FOR IP): Performed by: ADVANCED PRACTICE MIDWIFE

## 2024-06-17 PROCEDURE — 2580000003 HC RX 258: Performed by: ADVANCED PRACTICE MIDWIFE

## 2024-06-17 PROCEDURE — 7210000100 HC LABOR FEE PER 1 HR

## 2024-06-17 PROCEDURE — 6360000002 HC RX W HCPCS: Performed by: ANESTHESIOLOGY

## 2024-06-17 PROCEDURE — 3700000156 HC EPIDURAL ANESTHESIA

## 2024-06-17 PROCEDURE — 00HU33Z INSERTION OF INFUSION DEVICE INTO SPINAL CANAL, PERCUTANEOUS APPROACH: ICD-10-PCS | Performed by: ANESTHESIOLOGY

## 2024-06-17 PROCEDURE — 3E033VJ INTRODUCTION OF OTHER HORMONE INTO PERIPHERAL VEIN, PERCUTANEOUS APPROACH: ICD-10-PCS | Performed by: OBSTETRICS & GYNECOLOGY

## 2024-06-17 PROCEDURE — 62325 NJX INTERLAMINAR CRV/THRC: CPT | Performed by: ANESTHESIOLOGY

## 2024-06-17 RX ORDER — DIPHENHYDRAMINE HCL 25 MG
25 CAPSULE ORAL EVERY 6 HOURS PRN
Status: DISCONTINUED | OUTPATIENT
Start: 2024-06-17 | End: 2024-06-17 | Stop reason: HOSPADM

## 2024-06-17 RX ORDER — NALOXONE HYDROCHLORIDE 0.4 MG/ML
INJECTION, SOLUTION INTRAMUSCULAR; INTRAVENOUS; SUBCUTANEOUS PRN
Status: DISCONTINUED | OUTPATIENT
Start: 2024-06-17 | End: 2024-06-17 | Stop reason: HOSPADM

## 2024-06-17 RX ORDER — DIPHENHYDRAMINE HYDROCHLORIDE 50 MG/ML
25 INJECTION INTRAMUSCULAR; INTRAVENOUS EVERY 6 HOURS PRN
Status: DISCONTINUED | OUTPATIENT
Start: 2024-06-17 | End: 2024-06-17 | Stop reason: HOSPADM

## 2024-06-17 RX ORDER — ROPIVACAINE HYDROCHLORIDE 2 MG/ML
INJECTION, SOLUTION EPIDURAL; INFILTRATION; PERINEURAL PRN
Status: DISCONTINUED | OUTPATIENT
Start: 2024-06-17 | End: 2024-06-17 | Stop reason: SDUPTHER

## 2024-06-17 RX ORDER — FENTANYL/ROPIVACAINE/NS/PF 2MCG/ML-.2
8 PLASTIC BAG, INJECTION (ML) INJECTION CONTINUOUS
Status: DISCONTINUED | OUTPATIENT
Start: 2024-06-17 | End: 2024-06-17 | Stop reason: HOSPADM

## 2024-06-17 RX ORDER — IBUPROFEN 400 MG/1
800 TABLET ORAL EVERY 8 HOURS SCHEDULED
Status: DISCONTINUED | OUTPATIENT
Start: 2024-06-17 | End: 2024-06-18 | Stop reason: HOSPADM

## 2024-06-17 RX ORDER — LIDOCAINE HCL/EPINEPHRINE/PF 2%-1:200K
VIAL (ML) INJECTION PRN
Status: DISCONTINUED | OUTPATIENT
Start: 2024-06-17 | End: 2024-06-17 | Stop reason: SDUPTHER

## 2024-06-17 RX ORDER — ONDANSETRON 2 MG/ML
4 INJECTION INTRAMUSCULAR; INTRAVENOUS EVERY 6 HOURS PRN
Status: DISCONTINUED | OUTPATIENT
Start: 2024-06-17 | End: 2024-06-17 | Stop reason: HOSPADM

## 2024-06-17 RX ADMIN — ROPIVACAINE HYDROCHLORIDE 6 ML: 2 INJECTION EPIDURAL; INFILTRATION; PERINEURAL at 08:25

## 2024-06-17 RX ADMIN — SODIUM CHLORIDE, POTASSIUM CHLORIDE, SODIUM LACTATE AND CALCIUM CHLORIDE: 600; 310; 30; 20 INJECTION, SOLUTION INTRAVENOUS at 05:51

## 2024-06-17 RX ADMIN — LIDOCAINE HYDROCHLORIDE,EPINEPHRINE BITARTRATE 2.5 ML: 20; .005 INJECTION, SOLUTION EPIDURAL; INFILTRATION; INTRACAUDAL; PERINEURAL at 08:24

## 2024-06-17 RX ADMIN — DOCUSATE SODIUM 100 MG: 100 CAPSULE, LIQUID FILLED ORAL at 22:18

## 2024-06-17 RX ADMIN — IBUPROFEN 800 MG: 400 TABLET, FILM COATED ORAL at 15:02

## 2024-06-17 RX ADMIN — IBUPROFEN 800 MG: 400 TABLET, FILM COATED ORAL at 22:18

## 2024-06-17 RX ADMIN — Medication 8 ML/HR: at 08:26

## 2024-06-17 RX ADMIN — BUTORPHANOL TARTRATE 1 MG: 2 INJECTION, SOLUTION INTRAMUSCULAR; INTRAVENOUS at 02:27

## 2024-06-17 ASSESSMENT — PAIN DESCRIPTION - LOCATION
LOCATION: ABDOMEN;VAGINA
LOCATION: BACK

## 2024-06-17 ASSESSMENT — PAIN SCALES - GENERAL
PAINLEVEL_OUTOF10: 10
PAINLEVEL_OUTOF10: 6
PAINLEVEL_OUTOF10: 4

## 2024-06-17 ASSESSMENT — PAIN - FUNCTIONAL ASSESSMENT
PAIN_FUNCTIONAL_ASSESSMENT: ACTIVITIES ARE NOT PREVENTED

## 2024-06-17 ASSESSMENT — PAIN DESCRIPTION - DESCRIPTORS
DESCRIPTORS: CRAMPING;PRESSURE
DESCRIPTORS: ACHING
DESCRIPTORS: CRAMPING;ACHING

## 2024-06-17 ASSESSMENT — PAIN DESCRIPTION - ORIENTATION: ORIENTATION: LOWER

## 2024-06-17 NOTE — PROGRESS NOTES
Labor Progress Note    Cornelia Cortez  39w1d    Subjective:   Patient seen. FHR and ctx pattern evaluated. Pt does not have any complaints and is  resting comfortably. She is not feeling her contractions.      Objective:     Vitals:    06/17/24 0957   BP:    Pulse:    Resp:    Temp:    SpO2: 98%           Pelvic Exam:  Cervix: 6 cm   Effaced: 70%  Station:  -2    Membranes: Artificial Rupture of Membranes; Amniotic Fluid: small amount of clear fluid    Fetal Heart Rate: Baseline: 150 per minute  Variability: moderate  Accelerations: yes  Decelerations: variable  Uterine contractions: irregular, every 2-4 minutes    Plan:   Anticipate vaginal delivery   Continue to titrate pitocin as per protocol   Continue to change position to facilitate descent        CHRISTIANO Drake CNM  6/17/2024  10:25 AM

## 2024-06-17 NOTE — L&D DELIVERY NOTE
Delivery Note    Obstetrician:  CHRISTIANO Drake CNM    Assistant: none    Pre-Delivery Diagnosis: Term pregnancy, Induced labor, and Single fetus    Post-Delivery Diagnosis: Living  infant(s) and Female    Intrapartum Event: None    Procedure: Spontaneous vaginal delivery    Epidural: Yes    Monitor:  Fetal Heart Tones - External and Uterine Contractions - External    Indications for instrumental delivery: none    Estimated Blood Loss: 100 mL    Episiotomy: none    Laceration(s):  none    Laceration(s) repair: No    Presentation: Cephalic    Fetal Description: mckinney    Fetal Position: Right Occiput Anterior    Birth Weight: not yet assessed    Birth Length: not yet assessed    Apgar - One Minute: 8    Apgar - Five Minutes: 9    Umbilical Cord: Nuchal Cord x  2, 3 vessels present, and Cord blood sent to lab for type, Rh, and Debo' test    Specimens: placenta, delivered, intact, discarded           Complications:  none           Lab Results   Component Value Date/Time    ABORH O POSITIVE 2024 07:15 PM    RUBELLAEXT immune 2020 12:00 AM    RUBELLAEXT neg 2020 12:00 AM            Attending Attestation: I was present and scrubbed for the entire procedure.     Patient feeling urge to push and was found to be 10/100/+1. Patient started pushing adequately with coaching. Fetal head crowned and delivered shortly after in OA position which then restituted to MELI. Tight Nuchal cord x2 noted, unable to reduce, delivered through nuchal cord; and anterior shoulder was delivered without difficulty, baby was born at 1218 and placed on maternal abdomen for skin to skin. Cord was clamped after 4 minutes until pulsations ceased and cut by FOB.  Cord blood was collected for ABO/DNA. Pitocin infusing for active management of third stage.Placenta was delivered appearing intact, Garcia, and spontaneous at 1224. Perineum was inspected and was intact. Fundus firm at U-2.  mL. Counts correct.

## 2024-06-17 NOTE — ANESTHESIA PROCEDURE NOTES
Epidural Block    Patient location during procedure: pre-op  Start time: 6/17/2024 8:13 AM  End time: 6/17/2024 8:24 AM  Reason for block: labor epidural and at surgeon's request  Staffing  Performed: anesthesiologist   Anesthesiologist: Kana Yu DO  Performed by: Kana Yu DO  Authorized by: Kana Yu DO    Epidural  Patient position: sitting  Prep: ChloraPrep  Patient monitoring: cardiac monitor, continuous pulse ox and frequent blood pressure checks  Approach: midline  Location: L3-4  Injection technique: KALPANA air  Provider prep: mask and sterile gloves  Needle  Needle type: Tuohy   Needle gauge: 17 G  Needle length: 3.5 in  Needle insertion depth: 6 cm  Catheter type: end hole  Catheter size: 20 G  Catheter at skin depth: 11 cm  Test dose: negativeCatheter Secured: tegaderm and tape  Assessment  Hemodynamics: stable  Attempts: 2  Outcomes: uncomplicated and patient tolerated procedure well  Preanesthetic Checklist  Completed: patient identified, IV checked, site marked, risks and benefits discussed, surgical/procedural consents, equipment checked, pre-op evaluation, timeout performed, anesthesia consent given, oxygen available, monitors applied/VS acknowledged, fire risk safety assessment completed and verbalized and blood product R/B/A discussed and consented

## 2024-06-17 NOTE — PROGRESS NOTES
Labor Progress Note    Cornelia Cortez  39w1d    Subjective:   Patient seen. FHR and ctx pattern evaluated. Pt does not have any complaints and is  resting comfortably. She is not feeling her contractions.      Objective:     Vitals:    06/17/24 1107   BP:    Pulse:    Resp:    Temp:    SpO2: 98%           Pelvic Exam:  Cervix: 7 cm   Effaced: 70%  Station:  -2    Membranes:  AROM    Fetal Heart Rate: Baseline: 150 per minute  Variability: minimal  Accelerations: yes  Decelerations: variable  Uterine contractions: regular, every 2-3 minutes    Plan:   Anticipate vaginal delivery   Continue to titrate pitocin as per protocol   Continue to change position to facilitate descent        CHRISTIANO Drake - CNM  6/17/2024  11:29 AM

## 2024-06-17 NOTE — LACTATION NOTE
This note was copied from a baby's chart.     24 [id number]   Visit Information   Lactation Consult Visit Type IP Initial Consult   Visit Length 30 minutes   Referral Received From Referred by MD   Reason for Visit Education;Normal  Visit   Breast Feeding History/Assessment   Left Breast Soft   Left Nipple Protrude   Right Nipple Protrude   Right Breast Soft   Breastfeeding History Yes   Longest duration (#) 12   Longest Duration months   Complications Yes (Comment)  (per parents, son had teether oral ties that were not diagnosed or released until late in breastfeeding, caused mom to have mastitis and multiple clogged ducts.)   Feeding Assessment: Maternal Factors   Position and Latch Independently;Provides breast support   Maternal Response Comfortable   LATCH Documentation   Latch 0   Audible Swallowing 0   Type of Nipple 2   Comfort (Breast/Nipple) 2   Hold (Positioning) 1   LATCH Score 5   Care Plan/Breast Care   Breast Care Lanolin provided     Mom educated on breastfeeding basics--hunger cues, feeding on demand, waking baby if baby sleeps too long between feeds, importance of skin to skin, positioning and latching, risk of pacifier use and supplemental feedings, and importance of rooming in--and use of log sheet. Mom also educated on benefits of breastfeeding for herself and baby. Mom verbalized understanding. No questions at this time.  Attempted to get  latched. [place] sleepy and not interested in feeding at this time. Encouraged mom to keep  skin to skin to stimulate and wake for feeding. Will remain available.

## 2024-06-17 NOTE — PLAN OF CARE
Problem: Postpartum  Goal: Experiences normal postpartum course  Description:  Postpartum OB-Pregnancy care plan goal which identifies if the mother is experiencing a normal postpartum course  Outcome: Progressing  Goal: Appropriate maternal -  bonding  Description:  Postpartum OB-Pregnancy care plan goal which identifies if the mother and  are bonding appropriately  Outcome: Progressing  Goal: Establishment of infant feeding pattern  Description:  Postpartum OB-Pregnancy care plan goal which identifies if the mother is establishing a feeding pattern with their   Outcome: Progressing     Problem: Pain  Goal: Verbalizes/displays adequate comfort level or baseline comfort level  Outcome: Progressing  Flowsheets (Taken 2024 5339)  Verbalizes/displays adequate comfort level or baseline comfort level:   Encourage patient to monitor pain and request assistance   Assess pain using appropriate pain scale   Administer analgesics based on type and severity of pain and evaluate response   Implement non-pharmacological measures as appropriate and evaluate response   Consider cultural and social influences on pain and pain management   Notify Licensed Independent Practitioner if interventions unsuccessful or patient reports new pain     Problem: Infection - Adult  Goal: Absence of infection at discharge  Outcome: Progressing  Goal: Absence of infection during hospitalization  Outcome: Progressing  Goal: Absence of fever/infection during anticipated neutropenic period  Outcome: Progressing     Problem: Safety - Adult  Goal: Free from fall injury  Outcome: Progressing     Problem: Discharge Planning  Goal: Discharge to home or other facility with appropriate resources  Outcome: Progressing

## 2024-06-17 NOTE — PLAN OF CARE
Problem: Vaginal Birth or  Section  Goal: Fetal and maternal status remain reassuring during the birth process  Description:  Birth OB-Pregnancy care plan goal which identifies if the fetal and maternal status remain reassuring during the birth process  Outcome: Progressing     Problem: Postpartum  Goal: Experiences normal postpartum course  Description:  Postpartum OB-Pregnancy care plan goal which identifies if the mother is experiencing a normal postpartum course  2024 by Seth Avendano RN  Outcome: Progressing  2024 by Swapna Mccabe RN  Outcome: Progressing  Goal: Appropriate maternal -  bonding  Description:  Postpartum OB-Pregnancy care plan goal which identifies if the mother and  are bonding appropriately  2024 by Seth Avendano RN  Outcome: Progressing  2024 by Swapna Mccabe RN  Outcome: Progressing  Goal: Establishment of infant feeding pattern  Description:  Postpartum OB-Pregnancy care plan goal which identifies if the mother is establishing a feeding pattern with their   2024 by Seth Avendano RN  Outcome: Progressing  2024 by Swapna Mccabe RN  Outcome: Progressing     Problem: Pain  Goal: Verbalizes/displays adequate comfort level or baseline comfort level  2024 by Seth Avendano RN  Outcome: Progressing  2024 by Swapna Mccabe RN  Outcome: Progressing  Flowsheets (Taken 2024 1525)  Verbalizes/displays adequate comfort level or baseline comfort level:   Encourage patient to monitor pain and request assistance   Assess pain using appropriate pain scale   Administer analgesics based on type and severity of pain and evaluate response   Implement non-pharmacological measures as appropriate and evaluate response   Consider cultural and social influences on pain and pain management   Notify Licensed Independent Practitioner if interventions unsuccessful or patient reports

## 2024-06-17 NOTE — PROGRESS NOTES
Department of Obstetrics and Gynecology  Labor and Delivery   Labor Progress Note      SUBJECTIVE:  Patient resting comfortably. Denies any pain at this time.     OBJECTIVE:      Vitals:    /67   Pulse 92   Temp 98.6 °F (37 °C) (Oral)   Resp 18   Ht 5' 4\" (1.626 m)   Wt 163 lb (73.9 kg)   SpO2 100%   BMI 27.98 kg/m²         Fetal heart rate:       Baseline Heart Rate:  135        Accelerations:  present       Long Term Variability:  moderate       Decelerations:  absent         Contraction frequency: Occasional    Fetal Presentation:  Cephalic      Membranes:  Intact    Cervix:           Dilation:  1 cm         Effacement:  20%         Station:  -2         Consistency:  soft         Position:  posterior      Assessment:  Cook balloon to be placed followed by induction of labor with pitocin. Procedures, risks, and benefits of cook explained to patient with opportunity for answering all questions given. Verbal consent obtained. Patient was placed in lithotomy position. Cook catheter with stylet guide placed in cervical os without difficulty. Uterine balloon was tested with 50 cc then inflated to a total of 80 cc of sterile NS. Once uterine balloon was filled the vaginal balloon was filled and tested with 50 cc of NS. Once correct placement confirmed the vaginal balloon was filled to a total of 80 cc of NS. External catheter end secured to thigh. Patient tolerated procedure well. Fetal tracing remains reassuring.       Plan:  Continue to monitor for labor and reassuring fetal tracing  Anticipate   Low dose pitocin overnight  Plan to SHAQUILLE johnson in 12 hours           CHRISTIANO Khoury CNM

## 2024-06-17 NOTE — ANESTHESIA PRE PROCEDURE
Department of Anesthesiology  Preprocedure Note       Name:  Cornelia Cortez   Age:  28 y.o.  :  1995                                          MRN:  [id number]         Date:  2024      Surgeon: * No surgeons listed *    Procedure: * No procedures listed *    Medications prior to admission:   Prior to Admission medications    Medication Sig Start Date End Date Taking? Authorizing Provider   Prenatal MV-Min-Fe Fum-FA-DHA (PRENATAL 1 PO) Take by mouth    Provider, MD Olena   doxylamine-pyridoxine 10-10 MG TBEC 2 tabs at bedtime, then 1 tab in AM if needed, then 1 tab in afternoon if needed. Max 4 tabs/d  Patient not taking: Reported on 2024   Automatic Reconciliation, Ar   ondansetron (ZOFRAN-ODT) 4 MG disintegrating tablet Take 4 mg by mouth every 8 hours as needed  Patient not taking: Reported on 2024   Automatic Reconciliation, Ar   promethazine (PHENERGAN) 25 MG tablet Take 25 mg by mouth every 6 hours as needed  Patient not taking: Reported on 2024   Automatic Reconciliation, Ar       Current medications:    Current Facility-Administered Medications   Medication Dose Route Frequency Provider Last Rate Last Admin   • fentaNYL 2 mcg/mL and ROPivacaine 0.2% in sodium chloride 0.9% 100 mL epidural  8 mL/hr Epidural Continuous Kana Yu DO 8 mL/hr at 24 0826 8 mL/hr at 24 0826   • naloxone 0.4 mg in 10 mL sodium chloride syringe   IntraVENous PRN Kana Yu DO       • diphenhydrAMINE (BENADRYL) injection 25 mg  25 mg IntraVENous Q6H PRN Kana Yu DO        Or   • diphenhydrAMINE (BENADRYL) capsule 25 mg  25 mg Oral Q6H PRN Kana Yu DO       • ondansetron (ZOFRAN) injection 4 mg  4 mg IntraVENous Q6H PRN Kana Yu DO       • lactated ringers IV soln infusion   IntraVENous Continuous Cheuvdion, CHRISTIANO Emmanuel -  mL/hr at 24 0551 New Bag at 24 0551   • lactated ringers bolus 500 mL  500 mL

## 2024-06-18 VITALS
TEMPERATURE: 97.7 F | RESPIRATION RATE: 18 BRPM | OXYGEN SATURATION: 98 % | HEART RATE: 90 BPM | DIASTOLIC BLOOD PRESSURE: 66 MMHG | SYSTOLIC BLOOD PRESSURE: 111 MMHG

## 2024-06-18 LAB
HCT VFR BLD AUTO: 32.1 % (ref 35–45)
HGB BLD-MCNC: 9.8 G/DL (ref 12–16)
MCHC RBC AUTO-ENTMCNC: 30.5 G/DL (ref 31–37)
MCV RBC AUTO: 86.5 FL (ref 78–100)
NRBC # BLD: 0 K/UL (ref 0–0.01)
NRBC BLD-RTO: 0 PER 100 WBC
PLATELET # BLD AUTO: 267 K/UL (ref 135–420)
RBC # BLD AUTO: 3.71 M/UL (ref 4.2–5.3)

## 2024-06-18 PROCEDURE — 85027 COMPLETE CBC AUTOMATED: CPT

## 2024-06-18 PROCEDURE — 36415 COLL VENOUS BLD VENIPUNCTURE: CPT

## 2024-06-18 PROCEDURE — 6370000000 HC RX 637 (ALT 250 FOR IP): Performed by: ADVANCED PRACTICE MIDWIFE

## 2024-06-18 PROCEDURE — 6370000000 HC RX 637 (ALT 250 FOR IP): Performed by: MIDWIFE

## 2024-06-18 RX ORDER — MISOPROSTOL 200 UG/1
800 TABLET ORAL PRN
Status: DISCONTINUED | OUTPATIENT
Start: 2024-06-18 | End: 2024-06-18 | Stop reason: HOSPADM

## 2024-06-18 RX ORDER — DOCUSATE SODIUM 100 MG/1
100 CAPSULE, LIQUID FILLED ORAL 2 TIMES DAILY PRN
Status: DISCONTINUED | OUTPATIENT
Start: 2024-06-18 | End: 2024-06-18 | Stop reason: HOSPADM

## 2024-06-18 RX ORDER — SIMETHICONE 80 MG
80 TABLET,CHEWABLE ORAL EVERY 6 HOURS PRN
Status: DISCONTINUED | OUTPATIENT
Start: 2024-06-18 | End: 2024-06-18 | Stop reason: HOSPADM

## 2024-06-18 RX ORDER — CARBOPROST TROMETHAMINE 250 UG/ML
250 INJECTION, SOLUTION INTRAMUSCULAR PRN
Status: DISCONTINUED | OUTPATIENT
Start: 2024-06-18 | End: 2024-06-18 | Stop reason: HOSPADM

## 2024-06-18 RX ORDER — MISOPROSTOL 200 UG/1
600 TABLET ORAL PRN
Status: DISCONTINUED | OUTPATIENT
Start: 2024-06-18 | End: 2024-06-18 | Stop reason: HOSPADM

## 2024-06-18 RX ORDER — SODIUM CHLORIDE 0.9 % (FLUSH) 0.9 %
5-40 SYRINGE (ML) INJECTION EVERY 12 HOURS SCHEDULED
Status: DISCONTINUED | OUTPATIENT
Start: 2024-06-18 | End: 2024-06-18 | Stop reason: HOSPADM

## 2024-06-18 RX ORDER — METHYLERGONOVINE MALEATE 0.2 MG/ML
200 INJECTION INTRAVENOUS PRN
Status: DISCONTINUED | OUTPATIENT
Start: 2024-06-18 | End: 2024-06-18 | Stop reason: HOSPADM

## 2024-06-18 RX ORDER — TRANEXAMIC ACID 10 MG/ML
1000 INJECTION, SOLUTION INTRAVENOUS
Status: DISCONTINUED | OUTPATIENT
Start: 2024-06-18 | End: 2024-06-18 | Stop reason: HOSPADM

## 2024-06-18 RX ORDER — SENNA AND DOCUSATE SODIUM 50; 8.6 MG/1; MG/1
1 TABLET, FILM COATED ORAL DAILY PRN
Status: DISCONTINUED | OUTPATIENT
Start: 2024-06-18 | End: 2024-06-18 | Stop reason: HOSPADM

## 2024-06-18 RX ORDER — ONDANSETRON 4 MG/1
4 TABLET, ORALLY DISINTEGRATING ORAL EVERY 6 HOURS PRN
Status: DISCONTINUED | OUTPATIENT
Start: 2024-06-18 | End: 2024-06-18 | Stop reason: HOSPADM

## 2024-06-18 RX ORDER — SODIUM CHLORIDE, SODIUM LACTATE, POTASSIUM CHLORIDE, CALCIUM CHLORIDE 600; 310; 30; 20 MG/100ML; MG/100ML; MG/100ML; MG/100ML
INJECTION, SOLUTION INTRAVENOUS CONTINUOUS
Status: DISCONTINUED | OUTPATIENT
Start: 2024-06-18 | End: 2024-06-18

## 2024-06-18 RX ORDER — IBUPROFEN 800 MG/1
800 TABLET ORAL EVERY 8 HOURS SCHEDULED
Qty: 120 TABLET | Refills: 3 | Status: SHIPPED | OUTPATIENT
Start: 2024-06-18

## 2024-06-18 RX ORDER — SODIUM CHLORIDE 9 MG/ML
INJECTION, SOLUTION INTRAVENOUS PRN
Status: DISCONTINUED | OUTPATIENT
Start: 2024-06-18 | End: 2024-06-18 | Stop reason: HOSPADM

## 2024-06-18 RX ORDER — ONDANSETRON 2 MG/ML
4 INJECTION INTRAMUSCULAR; INTRAVENOUS EVERY 6 HOURS PRN
Status: DISCONTINUED | OUTPATIENT
Start: 2024-06-18 | End: 2024-06-18 | Stop reason: HOSPADM

## 2024-06-18 RX ORDER — ACETAMINOPHEN 500 MG
1000 TABLET ORAL EVERY 8 HOURS SCHEDULED
Status: DISCONTINUED | OUTPATIENT
Start: 2024-06-18 | End: 2024-06-18 | Stop reason: HOSPADM

## 2024-06-18 RX ORDER — SODIUM CHLORIDE 0.9 % (FLUSH) 0.9 %
5-40 SYRINGE (ML) INJECTION PRN
Status: DISCONTINUED | OUTPATIENT
Start: 2024-06-18 | End: 2024-06-18 | Stop reason: HOSPADM

## 2024-06-18 RX ORDER — LANOLIN/MINERAL OIL
LOTION (ML) TOPICAL PRN
Status: DISCONTINUED | OUTPATIENT
Start: 2024-06-18 | End: 2024-06-18 | Stop reason: HOSPADM

## 2024-06-18 RX ADMIN — IBUPROFEN 800 MG: 400 TABLET, FILM COATED ORAL at 05:30

## 2024-06-18 RX ADMIN — ACETAMINOPHEN 1000 MG: 500 TABLET ORAL at 13:50

## 2024-06-18 RX ADMIN — ACETAMINOPHEN 1000 MG: 500 TABLET ORAL at 05:30

## 2024-06-18 RX ADMIN — IBUPROFEN 800 MG: 400 TABLET, FILM COATED ORAL at 13:50

## 2024-06-18 RX ADMIN — DOCUSATE SODIUM 100 MG: 100 CAPSULE, LIQUID FILLED ORAL at 09:20

## 2024-06-18 ASSESSMENT — PAIN SCALES - GENERAL
PAINLEVEL_OUTOF10: 0
PAINLEVEL_OUTOF10: 4

## 2024-06-18 ASSESSMENT — PAIN - FUNCTIONAL ASSESSMENT: PAIN_FUNCTIONAL_ASSESSMENT: ACTIVITIES ARE NOT PREVENTED

## 2024-06-18 NOTE — PLAN OF CARE
Problem: Vaginal Birth or  Section  Goal: Fetal and maternal status remain reassuring during the birth process  Description:  Birth OB-Pregnancy care plan goal which identifies if the fetal and maternal status remain reassuring during the birth process  Outcome: Progressing     Problem: Postpartum  Goal: Experiences normal postpartum course  Description:  Postpartum OB-Pregnancy care plan goal which identifies if the mother is experiencing a normal postpartum course  Outcome: Progressing  Goal: Appropriate maternal -  bonding  Description:  Postpartum OB-Pregnancy care plan goal which identifies if the mother and  are bonding appropriately  Outcome: Progressing  Goal: Establishment of infant feeding pattern  Description:  Postpartum OB-Pregnancy care plan goal which identifies if the mother is establishing a feeding pattern with their   Outcome: Progressing     Problem: Pain  Goal: Verbalizes/displays adequate comfort level or baseline comfort level  Outcome: Progressing  Flowsheets (Taken 2024 1005)  Verbalizes/displays adequate comfort level or baseline comfort level:   Encourage patient to monitor pain and request assistance   Assess pain using appropriate pain scale   Administer analgesics based on type and severity of pain and evaluate response   Implement non-pharmacological measures as appropriate and evaluate response   Consider cultural and social influences on pain and pain management   Notify Licensed Independent Practitioner if interventions unsuccessful or patient reports new pain     Problem: Infection - Adult  Goal: Absence of infection at discharge  Outcome: Progressing  Goal: Absence of infection during hospitalization  Outcome: Progressing  Goal: Absence of fever/infection during anticipated neutropenic period  Outcome: Progressing     Problem: Safety - Adult  Goal: Free from fall injury  Outcome: Progressing     Problem: Discharge Planning  Goal: Discharge to

## 2024-06-18 NOTE — ANESTHESIA POSTPROCEDURE EVALUATION
Department of Anesthesiology  Postprocedure Note    Patient: Cornelia Cortez  MRN: [id number]  YOB: 1995  Date of evaluation: 6/18/2024    Procedure Summary       Date: 06/17/24 Room / Location:     Anesthesia Start: 0813 Anesthesia Stop: 1218    Procedure: Labor Analgesia Diagnosis:     Scheduled Providers:  Responsible Provider: Kana Yu DO    Anesthesia Type: epidural ASA Status: 2            Anesthesia Type: No value filed.    Sonja Phase I:      Sonja Phase II:      Anesthesia Post Evaluation    No notable events documented.

## 2024-06-18 NOTE — PROGRESS NOTES
Progress Note    Patient: Cornelia oCrtez MRN: [id number]     YOB: 1995  Age: 28 y.o.       Subjective:     Postpartum Day: 1    The patient is feeling well. Pain is well controlled with current medications. Urinary output is adequate. Baby is feeding via breast without difficulty.    Objective:      Patient Vitals for the past 12 hrs:   Temp Pulse Resp BP SpO2   06/17/24 2330 99.2 °F (37.3 °C) 93 16 105/66 100 %       General:    alert, cooperative, no distress   Lochia:  appropriate   Uterine Fundus:   firm @ umbilicus    Perineum:  well-approximated   DVT Evaluation:  No evidence of DVT seen on physical exam.     Lab/Data Review:  Recent Results (from the past 24 hour(s))   CBC    Collection Time: 06/18/24  5:38 AM   Result Value Ref Range    WBC 15.5 (H) 4.6 - 13.2 K/uL    RBC 3.71 (L) 4.20 - 5.30 M/uL    Hemoglobin 9.8 (L) 12.0 - 16.0 g/dL    Hematocrit 32.1 (L) 35.0 - 45.0 %    MCV 86.5 78.0 - 100.0 FL    MCH 26.4 24.0 - 34.0 PG    MCHC 30.5 (L) 31.0 - 37.0 g/dL    RDW 13.7 11.6 - 14.5 %    Platelets 267 135 - 420 K/uL    MPV 11.3 9.2 - 11.8 FL    Nucleated RBCs 0.0 0  WBC    nRBC 0.00 0.00 - 0.01 K/uL     Assessment:     Delivery: spontaneous vaginal delivery    Plan:     Doing well postpartum vaginal delivery. Continue current postpartum care. Encouraged hydration, nutrition and ambulation. Plan DC home this afternoon.    Signed By: XANDER Garcia    June 18, 2024

## 2024-06-18 NOTE — LACTATION NOTE
This note was copied from a baby's chart.     06/18/24 1320   Visit Information   Lactation Consult Visit Type IP Consult Follow Up   Visit Length 30 minutes   Referral Received From Lactation Consultant Follow-up   Reason for Visit Education     Lactation discharge education completed. Mom verbalized understanding. Will remain available.

## 2024-06-18 NOTE — DISCHARGE SUMMARY
Name: Cornelia Cortez MRN: [id number]  SSN: [id number]    YOB: 1995  Age: 28 y.o.  Sex: female      Allergies: Patient has no known allergies.    Admit Date: 2024    Discharge Date: 2024     Admitting Physician: Boubacar Lock MD     Attending Physician:  Boubacar Lock MD     * Admission Diagnoses: IUP (intrauterine pregnancy), incidental [Z33.1]    * Discharge Diagnoses:   Information for the patient's :  Aamir Cortez [[id number]]   @[id number]@      Additional Diagnoses:    Lab Results   Component Value Date/Time    ABORH O POSITIVE 2024 07:15 PM    RUBELLAEXT immune 2020 12:00 AM    RUBELLAEXT neg 2020 12:00 AM      Immunization History   Administered Date(s) Administered    Influenza, FLUARIX, FLULAVAL, FLUZONE (age 6 mo+) AND AFLURIA, (age 3 y+), PF, 0.5mL 2020       * Procedures:   * No surgery found *           * Discharge Condition: Good    * Hospital Course: Normal hospital course following the delivery.    * Disposition: Home    Discharge Medications:      Medication List        START taking these medications      ibuprofen 800 MG tablet  Commonly known as: ADVIL;MOTRIN  Take 1 tablet by mouth every 8 hours            CONTINUE taking these medications      PRENATAL 1 PO            STOP taking these medications      doxylamine-pyridoxine 10-10 MG Tbec     ondansetron 4 MG disintegrating tablet  Commonly known as: ZOFRAN-ODT     promethazine 25 MG tablet  Commonly known as: PHENERGAN               Where to Get Your Medications        [id number] [place], [place] - [address] - P [phone] - F [phone] [address] [facility] [address]      Phone: [phone]   ibuprofen 800 MG tablet         * Follow-up Care/Patient Instructions:  Activity: activity as tolerated  Diet: regular diet  Wound Care: none needed    [unfilled]             Name: Cornelia Cortez